# Patient Record
Sex: MALE | Race: WHITE | NOT HISPANIC OR LATINO | Employment: FULL TIME | URBAN - METROPOLITAN AREA
[De-identification: names, ages, dates, MRNs, and addresses within clinical notes are randomized per-mention and may not be internally consistent; named-entity substitution may affect disease eponyms.]

---

## 2018-04-09 ENCOUNTER — HOSPITAL ENCOUNTER (OUTPATIENT)
Facility: HOSPITAL | Age: 38
LOS: 1 days | Discharge: HOME | End: 2018-04-12
Attending: EMERGENCY MEDICINE | Admitting: INTERNAL MEDICINE

## 2018-04-09 DIAGNOSIS — Q85.00 NEUROFIBROMATOSIS: ICD-10-CM

## 2018-04-09 DIAGNOSIS — K92.2 GI BLEED DUE TO NSAIDS: ICD-10-CM

## 2018-04-09 DIAGNOSIS — T39.395A GI BLEED DUE TO NSAIDS: ICD-10-CM

## 2018-04-09 DIAGNOSIS — D50.0 ANEMIA DUE TO BLOOD LOSS: Primary | ICD-10-CM

## 2018-04-09 DIAGNOSIS — K92.0 HEMATEMESIS WITH NAUSEA: ICD-10-CM

## 2018-04-09 PROBLEM — R10.9 ABDOMINAL PAIN: Chronic | Status: ACTIVE | Noted: 2018-04-09

## 2018-04-09 PROBLEM — Z72.0 TOBACCO ABUSE: Chronic | Status: ACTIVE | Noted: 2018-04-09

## 2018-04-09 LAB
ABO + RH BLD: NORMAL
ALBUMIN SERPL BCP-MCNC: 3.7 G/DL
ALP SERPL-CCNC: 49 U/L
ALT SERPL W/O P-5'-P-CCNC: 38 U/L
ANION GAP SERPL CALC-SCNC: 7 MMOL/L
APTT BLDCRRT: 26.1 SEC
AST SERPL-CCNC: 26 U/L
BASOPHILS # BLD AUTO: 0.04 K/UL
BASOPHILS NFR BLD: 0.2 %
BILIRUB SERPL-MCNC: 0.3 MG/DL
BLD GP AB SCN CELLS X3 SERPL QL: NORMAL
BUN SERPL-MCNC: 52 MG/DL
CALCIUM SERPL-MCNC: 8.7 MG/DL
CHLORIDE SERPL-SCNC: 109 MMOL/L
CO2 SERPL-SCNC: 20 MMOL/L
CREAT SERPL-MCNC: 0.9 MG/DL
DIFFERENTIAL METHOD: ABNORMAL
EOSINOPHIL # BLD AUTO: 0 K/UL
EOSINOPHIL NFR BLD: 0 %
ERYTHROCYTE [DISTWIDTH] IN BLOOD BY AUTOMATED COUNT: 12.9 %
EST. GFR  (AFRICAN AMERICAN): >60 ML/MIN/1.73 M^2
EST. GFR  (NON AFRICAN AMERICAN): >60 ML/MIN/1.73 M^2
GLUCOSE SERPL-MCNC: 133 MG/DL
HCT VFR BLD AUTO: 28.1 %
HGB BLD-MCNC: 9.4 G/DL
IMM GRANULOCYTES # BLD AUTO: 0.34 K/UL
IMM GRANULOCYTES NFR BLD AUTO: 1.9 %
LYMPHOCYTES # BLD AUTO: 2.4 K/UL
LYMPHOCYTES NFR BLD: 13.7 %
MCH RBC QN AUTO: 32.3 PG
MCHC RBC AUTO-ENTMCNC: 33.5 G/DL
MCV RBC AUTO: 97 FL
MONOCYTES # BLD AUTO: 0.8 K/UL
MONOCYTES NFR BLD: 4.4 %
NEUTROPHILS # BLD AUTO: 13.9 K/UL
NEUTROPHILS NFR BLD: 79.8 %
NRBC BLD-RTO: 0 /100 WBC
PLATELET # BLD AUTO: 362 K/UL
PMV BLD AUTO: 11.3 FL
POTASSIUM SERPL-SCNC: 4.5 MMOL/L
PROT SERPL-MCNC: 6.7 G/DL
RBC # BLD AUTO: 2.91 M/UL
SODIUM SERPL-SCNC: 136 MMOL/L
WBC # BLD AUTO: 17.46 K/UL

## 2018-04-09 PROCEDURE — 85730 THROMBOPLASTIN TIME PARTIAL: CPT

## 2018-04-09 PROCEDURE — G0378 HOSPITAL OBSERVATION PER HR: HCPCS

## 2018-04-09 PROCEDURE — 96375 TX/PRO/DX INJ NEW DRUG ADDON: CPT

## 2018-04-09 PROCEDURE — 63600175 PHARM REV CODE 636 W HCPCS: Performed by: INTERNAL MEDICINE

## 2018-04-09 PROCEDURE — 80053 COMPREHEN METABOLIC PANEL: CPT

## 2018-04-09 PROCEDURE — 96374 THER/PROPH/DIAG INJ IV PUSH: CPT

## 2018-04-09 PROCEDURE — 96376 TX/PRO/DX INJ SAME DRUG ADON: CPT

## 2018-04-09 PROCEDURE — C9113 INJ PANTOPRAZOLE SODIUM, VIA: HCPCS | Performed by: INTERNAL MEDICINE

## 2018-04-09 PROCEDURE — 86920 COMPATIBILITY TEST SPIN: CPT

## 2018-04-09 PROCEDURE — 86850 RBC ANTIBODY SCREEN: CPT

## 2018-04-09 PROCEDURE — 25500020 PHARM REV CODE 255: Performed by: EMERGENCY MEDICINE

## 2018-04-09 PROCEDURE — 85025 COMPLETE CBC W/AUTO DIFF WBC: CPT

## 2018-04-09 PROCEDURE — 36415 COLL VENOUS BLD VENIPUNCTURE: CPT

## 2018-04-09 PROCEDURE — 25000003 PHARM REV CODE 250: Performed by: INTERNAL MEDICINE

## 2018-04-09 PROCEDURE — 99285 EMERGENCY DEPT VISIT HI MDM: CPT

## 2018-04-09 RX ORDER — PANTOPRAZOLE SODIUM 40 MG/10ML
40 INJECTION, POWDER, LYOPHILIZED, FOR SOLUTION INTRAVENOUS EVERY 12 HOURS
Status: DISPENSED | OUTPATIENT
Start: 2018-04-09 | End: 2018-04-11

## 2018-04-09 RX ORDER — ONDANSETRON 2 MG/ML
4 INJECTION INTRAMUSCULAR; INTRAVENOUS EVERY 8 HOURS PRN
Status: DISCONTINUED | OUTPATIENT
Start: 2018-04-09 | End: 2018-04-11

## 2018-04-09 RX ORDER — IBUPROFEN 200 MG
600 TABLET ORAL EVERY 8 HOURS PRN
Status: ON HOLD | COMMUNITY
End: 2018-04-12 | Stop reason: HOSPADM

## 2018-04-09 RX ORDER — IBUPROFEN 200 MG
1 TABLET ORAL DAILY PRN
Status: DISCONTINUED | OUTPATIENT
Start: 2018-04-09 | End: 2018-04-12 | Stop reason: HOSPADM

## 2018-04-09 RX ORDER — ACETAMINOPHEN 325 MG/1
650 TABLET ORAL EVERY 4 HOURS PRN
Status: DISCONTINUED | OUTPATIENT
Start: 2018-04-09 | End: 2018-04-12 | Stop reason: HOSPADM

## 2018-04-09 RX ORDER — MORPHINE SULFATE 4 MG/ML
2 INJECTION, SOLUTION INTRAMUSCULAR; INTRAVENOUS EVERY 4 HOURS PRN
Status: DISCONTINUED | OUTPATIENT
Start: 2018-04-09 | End: 2018-04-12 | Stop reason: HOSPADM

## 2018-04-09 RX ORDER — PROMETHAZINE HYDROCHLORIDE 25 MG/1
25 TABLET ORAL EVERY 6 HOURS PRN
Status: DISCONTINUED | OUTPATIENT
Start: 2018-04-09 | End: 2018-04-12 | Stop reason: HOSPADM

## 2018-04-09 RX ORDER — SODIUM CHLORIDE 0.9 % (FLUSH) 0.9 %
5 SYRINGE (ML) INJECTION
Status: DISCONTINUED | OUTPATIENT
Start: 2018-04-09 | End: 2018-04-12 | Stop reason: HOSPADM

## 2018-04-09 RX ORDER — SODIUM CHLORIDE 9 MG/ML
INJECTION, SOLUTION INTRAVENOUS CONTINUOUS
Status: DISCONTINUED | OUTPATIENT
Start: 2018-04-09 | End: 2018-04-10

## 2018-04-09 RX ADMIN — MORPHINE SULFATE 2 MG: 4 INJECTION INTRAVENOUS at 09:04

## 2018-04-09 RX ADMIN — MORPHINE SULFATE 2 MG: 4 INJECTION INTRAVENOUS at 05:04

## 2018-04-09 RX ADMIN — IOHEXOL 75 ML: 350 INJECTION, SOLUTION INTRAVENOUS at 02:04

## 2018-04-09 RX ADMIN — SODIUM CHLORIDE 1000 ML: 9 INJECTION, SOLUTION INTRAVENOUS at 05:04

## 2018-04-09 RX ADMIN — PANTOPRAZOLE SODIUM 40 MG: 40 INJECTION, POWDER, FOR SOLUTION INTRAVENOUS at 05:04

## 2018-04-09 NOTE — ED NOTES
Radiology Tech. Reports iv in RAC infiltrated when flushing with ns before ct, reports removing the iv and starting 20g iv in LAC

## 2018-04-09 NOTE — PLAN OF CARE
Problem: Fall Risk (Adult)  Goal: Identify Related Risk Factors and Signs and Symptoms  Related risk factors and signs and symptoms are identified upon initiation of Human Response Clinical Practice Guideline (CPG)    04/09/18 8079   Fall Risk   Related Risk Factors (Fall Risk) history of falls

## 2018-04-09 NOTE — ED PROVIDER NOTES
Encounter Date: 4/9/2018       History     Chief Complaint   Patient presents with    Shortness of Breath     last night    Abdominal Pain    Vomiting    Hematemesis     last night    Rectal Bleeding     last night    Dizziness     P is a 36 yo WM with c/o SOB and fatigue for the past few days. Pt also states he has been passing blood per rectum and emisis. Pt states he takes a lot of Ibuprophen every day.           Review of patient's allergies indicates:   Allergen Reactions    Toradol [ketorolac] Other (See Comments)     Chest pain      Past Medical History:   Diagnosis Date    Asthma     Kidney stones     Neurofibromatosis, type 1 (von Recklinghausen's disease)      Past Surgical History:   Procedure Laterality Date    LITHOTRIPSY Left      Family History   Problem Relation Age of Onset    Heart disease Mother      Social History   Substance Use Topics    Smoking status: Current Every Day Smoker     Packs/day: 2.00     Types: Cigarettes    Smokeless tobacco: Never Used    Alcohol use 1.2 oz/week     2 Standard drinks or equivalent per week     Review of Systems   Constitutional: Positive for activity change and fatigue.   Gastrointestinal: Positive for blood in stool.       Physical Exam     Initial Vitals [04/09/18 1247]   BP Pulse Resp Temp SpO2   124/77 110 20 97.6 °F (36.4 °C) 100 %      MAP       92.67         Physical Exam    Nursing note and vitals reviewed.  Constitutional: He appears well-developed and well-nourished. He appears lethargic. He is easily aroused. He has a sickly appearance. He appears ill.   HENT:   Head: Normocephalic.   Right Ear: External ear normal.   Left Ear: External ear normal.   Nose: Nose normal.   Mouth/Throat: Oropharynx is clear and moist.   Eyes: Conjunctivae and EOM are normal. Pupils are equal, round, and reactive to light.   Neck: Normal range of motion. Neck supple.   Cardiovascular: Normal rate, regular rhythm, normal heart sounds and intact distal  pulses.   No murmur heard.  Pulmonary/Chest: Breath sounds normal. He has no wheezes. He has no rhonchi. He has no rales.   Abdominal: Soft. Bowel sounds are normal. He exhibits no mass. There is no tenderness.   Genitourinary: Penis normal.   Musculoskeletal: Normal range of motion.   Neurological: He is oriented to person, place, and time and easily aroused. He has normal strength and normal reflexes. He appears lethargic. He displays normal reflexes. No cranial nerve deficit.   Skin: Skin is dry and intact. Capillary refill takes less than 2 seconds. There is pallor.   Psychiatric: He has a normal mood and affect. His behavior is normal. Judgment and thought content normal.         ED Course   Procedures  Labs Reviewed   APTT   CBC W/ AUTO DIFFERENTIAL   COMPREHENSIVE METABOLIC PANEL   TYPE & SCREEN                                  Clinical Impression:   The primary encounter diagnosis was Anemia due to blood loss. Diagnoses of GI bleed due to NSAIDs, Hematemesis with nausea, and Neurofibromatosis were also pertinent to this visit.                           Joel Espinoza MD  04/09/18 7702

## 2018-04-09 NOTE — SUBJECTIVE & OBJECTIVE
Past Medical History:   Diagnosis Date    Asthma     Kidney stones     Neurofibromatosis        Past Surgical History:   Procedure Laterality Date    LITHOTRIPSY Left        Review of patient's allergies indicates:   Allergen Reactions    Toradol [ketorolac] Other (See Comments)     Chest pain        No current facility-administered medications on file prior to encounter.      Current Outpatient Prescriptions on File Prior to Encounter   Medication Sig    [DISCONTINUED] alprazolam (XANAX) 2 MG tablet Take 2 mg by mouth nightly as needed.      [DISCONTINUED] carisoprodol (SOMA) 350 MG tablet Take 350 mg by mouth 4 (four) times daily as needed.      [DISCONTINUED] oxycodone (OXYCONTIN) 30 MG Tb12 Take 30 mg by mouth every 12 (twelve) hours.       Family History     Problem Relation (Age of Onset)    Heart disease Mother        Social History Main Topics    Smoking status: Current Every Day Smoker     Packs/day: 2.00     Types: Cigarettes    Smokeless tobacco: Never Used    Alcohol use 1.2 oz/week     2 Standard drinks or equivalent per week      Comment: 2-3 crown's per night    Drug use: No    Sexual activity: Yes     Partners: Female      Comment: fiance     Review of Systems 12 point review of systems reviewed with patient and otherwise negative unless listed in HPI  Objective:     Vital Signs (Most Recent):  Temp: 97.8 °F (36.6 °C) (04/09/18 1628)  Pulse: 102 (04/09/18 1628)  Resp: 18 (04/09/18 1628)  BP: 124/65 (04/09/18 1628)  SpO2: 98 % (04/09/18 1628) Vital Signs (24h Range):  Temp:  [97.6 °F (36.4 °C)-97.8 °F (36.6 °C)] 97.8 °F (36.6 °C)  Pulse:  [102-110] 102  Resp:  [10-20] 18  SpO2:  [96 %-100 %] 98 %  BP: (103-124)/(54-90) 124/65     Weight: 81.6 kg (180 lb)  Body mass index is 30.9 kg/m².    Physical Exam     AAOx4, looks ill, pallor  Pupils equal and reactive, OP mod dry, poor dentition  Neck supple  Tachy but regular 110's; Right ACW without lesions, TTP  Diffuse mild expiratory wheeze otw  clear  Soft, diffuse mild to mod tenderness without G/R, decreased BS  No edema, no rash  Moves all extremities equal and on command

## 2018-04-09 NOTE — ASSESSMENT & PLAN NOTE
Sx's concerning for GI blood loss/PUD.  Hgb 9.4 (14.7 4/2015), admit BUN >50 with normal Cr.  CT-A abdomen and pelvis normal.  High dose NSAID use recently with tobacco use and daily etoh intake.  Admit obs for PPI, EGD (I spoke with Dr Garcia), +/- colonoscopy, iron studies.  Type and screen and transfuse if further Hgb decrease after hydration (as currently symptomatic).

## 2018-04-09 NOTE — HPI
"36 yo male with several weeks intermittent N/V without food relations.  Occ the emesis is dark.  2 weeks ago "strained" right lateral chest and has been taking Ibuprofen 800mg 2-3 x's per day.  Has noticed dark stools for last few weeks (very vague) with BM's about q72 hours.  No known PUD.  No weight loss.  Last night, paulina noted him very pale then diaphoretic with near syncopal event in shower (no LOC but minor face trauma).  Because of ongoing pain, presented to ER today.  "

## 2018-04-09 NOTE — H&P
"[facility] Medicine  History & Physical    Patient Name: Sandeep Hansen Jr.  MRN: [id number]  Admission Date: 4/9/2018  Attending Physician: Maldonado Dueñas MD   Primary Care Provider: Primary Doctor No         Patient information was obtained from patient and ER records.     Subjective:     Principal Problem:Anemia due to blood loss    Chief Complaint:   Chief Complaint   Patient presents with    Shortness of Breath     last night    Abdominal Pain    Vomiting    Hematemesis     last night    Rectal Bleeding     last night    Dizziness        HPI: 38 yo male with several weeks intermittent N/V without food relations.  Occ the emesis is dark.  2 weeks ago "strained" right lateral chest and has been taking Ibuprofen 800mg 2-3 x's per day.  Has noticed dark stools for last few weeks (very vague) with BM's about q72 hours.  No known PUD.  No weight loss.  Last night, fiance noted him very pale then diaphoretic with near syncopal event in shower (no LOC but minor face trauma).  Because of ongoing pain, presented to ER today.    Past Medical History:   Diagnosis Date    Asthma     Kidney stones     Neurofibromatosis        Past Surgical History:   Procedure Laterality Date    LITHOTRIPSY Left        Review of patient's allergies indicates:   Allergen Reactions    Toradol [ketorolac] Other (See Comments)     Chest pain        No current facility-administered medications on file prior to encounter.      Current Outpatient Prescriptions on File Prior to Encounter   Medication Sig    [DISCONTINUED] alprazolam (XANAX) 2 MG tablet Take 2 mg by mouth nightly as needed.      [DISCONTINUED] carisoprodol (SOMA) 350 MG tablet Take 350 mg by mouth 4 (four) times daily as needed.      [DISCONTINUED] oxycodone (OXYCONTIN) 30 MG Tb12 Take 30 mg by mouth every 12 (twelve) hours.       Family History     Problem Relation (Age of Onset)    Heart disease Mother        Social History " Main Topics    Smoking status: Current Every Day Smoker     Packs/day: 2.00     Types: Cigarettes    Smokeless tobacco: Never Used    Alcohol use 1.2 oz/week     2 Standard drinks or equivalent per week      Comment: 2-3 crown's per night    Drug use: No    Sexual activity: Yes     Partners: Female      Comment: fiance     Review of Systems 12 point review of systems reviewed with patient and otherwise negative unless listed in HPI  Objective:     Vital Signs (Most Recent):  Temp: 97.8 °F (36.6 °C) (04/09/18 1628)  Pulse: 102 (04/09/18 1628)  Resp: 18 (04/09/18 1628)  BP: 124/65 (04/09/18 1628)  SpO2: 98 % (04/09/18 1628) Vital Signs (24h Range):  Temp:  [97.6 °F (36.4 °C)-97.8 °F (36.6 °C)] 97.8 °F (36.6 °C)  Pulse:  [102-110] 102  Resp:  [10-20] 18  SpO2:  [96 %-100 %] 98 %  BP: (103-124)/(54-90) 124/65     Weight: 81.6 kg (180 lb)  Body mass index is 30.9 kg/m².    Physical Exam     AAOx4, looks ill, pallor  Pupils equal and reactive, OP mod dry, poor dentition  Neck supple  Tachy but regular 110's; Right ACW without lesions, TTP  Diffuse mild expiratory wheeze otw clear  Soft, diffuse mild to mod tenderness without G/R, decreased BS  No edema, no rash  Moves all extremities equal and on command    Assessment/Plan:     * Anemia due to blood loss    Sx's concerning for GI blood loss/PUD.  Hgb 9.4 (14.7 4/2015), admit BUN >50 with normal Cr.  CT-A abdomen and pelvis normal.  High dose NSAID use recently with tobacco use and daily etoh intake.  Admit obs for PPI, EGD (I spoke with Dr Garcia), +/- colonoscopy, iron studies.  Type and screen and transfuse if further Hgb decrease after hydration (as currently symptomatic).          GI bleed due to NSAIDs    As above            Tobacco abuse    lectured          VTE Risk Mitigation         Ordered     IP VTE HIGH RISK PATIENT  Once      04/09/18 1631     Place sequential compression device  Until discontinued      04/09/18 1631             Maldonado Dueñas,  MD  Department of Hospital Medicine   [facility] - Med Surg

## 2018-04-10 ENCOUNTER — ANESTHESIA EVENT (OUTPATIENT)
Dept: SURGERY | Facility: HOSPITAL | Age: 38
End: 2018-04-10

## 2018-04-10 ENCOUNTER — ANESTHESIA (OUTPATIENT)
Dept: SURGERY | Facility: HOSPITAL | Age: 38
End: 2018-04-10

## 2018-04-10 LAB
ANION GAP SERPL CALC-SCNC: 5 MMOL/L
BLD PROD TYP BPU: NORMAL
BLD PROD TYP BPU: NORMAL
BLOOD UNIT EXPIRATION DATE: NORMAL
BLOOD UNIT EXPIRATION DATE: NORMAL
BLOOD UNIT TYPE CODE: 5100
BLOOD UNIT TYPE CODE: 5100
BLOOD UNIT TYPE: NORMAL
BLOOD UNIT TYPE: NORMAL
BUN SERPL-MCNC: 25 MG/DL
CALCIUM SERPL-MCNC: 8.4 MG/DL
CHLORIDE SERPL-SCNC: 110 MMOL/L
CO2 SERPL-SCNC: 21 MMOL/L
CODING SYSTEM: NORMAL
CODING SYSTEM: NORMAL
CREAT SERPL-MCNC: 0.9 MG/DL
DISPENSE STATUS: NORMAL
DISPENSE STATUS: NORMAL
ERYTHROCYTE [DISTWIDTH] IN BLOOD BY AUTOMATED COUNT: 13.2 %
EST. GFR  (AFRICAN AMERICAN): >60 ML/MIN/1.73 M^2
EST. GFR  (NON AFRICAN AMERICAN): >60 ML/MIN/1.73 M^2
GLUCOSE SERPL-MCNC: 101 MG/DL
HCT VFR BLD AUTO: 22 %
HGB BLD-MCNC: 7.5 G/DL
LIPASE SERPL-CCNC: 11 U/L
MCH RBC QN AUTO: 32.3 PG
MCHC RBC AUTO-ENTMCNC: 34.1 G/DL
MCV RBC AUTO: 95 FL
NUM UNITS TRANS PACKED RBC: NORMAL
NUM UNITS TRANS PACKED RBC: NORMAL
PLATELET # BLD AUTO: 268 K/UL
PMV BLD AUTO: 10.8 FL
POTASSIUM SERPL-SCNC: 4.1 MMOL/L
RBC # BLD AUTO: 2.32 M/UL
SODIUM SERPL-SCNC: 136 MMOL/L
WBC # BLD AUTO: 11.52 K/UL

## 2018-04-10 PROCEDURE — 96375 TX/PRO/DX INJ NEW DRUG ADDON: CPT

## 2018-04-10 PROCEDURE — 25000003 PHARM REV CODE 250: Performed by: INTERNAL MEDICINE

## 2018-04-10 PROCEDURE — 36430 TRANSFUSION BLD/BLD COMPNT: CPT

## 2018-04-10 PROCEDURE — 36415 COLL VENOUS BLD VENIPUNCTURE: CPT

## 2018-04-10 PROCEDURE — 43239 EGD BIOPSY SINGLE/MULTIPLE: CPT | Performed by: SURGERY

## 2018-04-10 PROCEDURE — 37000009 HC ANESTHESIA EA ADD 15 MINS: Performed by: SURGERY

## 2018-04-10 PROCEDURE — 27201012 HC FORCEPS, HOT/COLD, DISP: Performed by: SURGERY

## 2018-04-10 PROCEDURE — 63600175 PHARM REV CODE 636 W HCPCS: Performed by: NURSE ANESTHETIST, CERTIFIED REGISTERED

## 2018-04-10 PROCEDURE — 25000003 PHARM REV CODE 250: Performed by: ANESTHESIOLOGY

## 2018-04-10 PROCEDURE — G0378 HOSPITAL OBSERVATION PER HR: HCPCS

## 2018-04-10 PROCEDURE — P9016 RBC LEUKOCYTES REDUCED: HCPCS

## 2018-04-10 PROCEDURE — 37000008 HC ANESTHESIA 1ST 15 MINUTES: Performed by: SURGERY

## 2018-04-10 PROCEDURE — 88305 TISSUE EXAM BY PATHOLOGIST: CPT | Mod: 26,,, | Performed by: PATHOLOGY

## 2018-04-10 PROCEDURE — 88342 IMHCHEM/IMCYTCHM 1ST ANTB: CPT | Performed by: PATHOLOGY

## 2018-04-10 PROCEDURE — 88342 IMHCHEM/IMCYTCHM 1ST ANTB: CPT | Mod: 26,,, | Performed by: PATHOLOGY

## 2018-04-10 PROCEDURE — 96376 TX/PRO/DX INJ SAME DRUG ADON: CPT

## 2018-04-10 PROCEDURE — 80048 BASIC METABOLIC PNL TOTAL CA: CPT

## 2018-04-10 PROCEDURE — 85027 COMPLETE CBC AUTOMATED: CPT

## 2018-04-10 PROCEDURE — 63600175 PHARM REV CODE 636 W HCPCS: Performed by: INTERNAL MEDICINE

## 2018-04-10 PROCEDURE — 88305 TISSUE EXAM BY PATHOLOGIST: CPT | Performed by: PATHOLOGY

## 2018-04-10 PROCEDURE — 83690 ASSAY OF LIPASE: CPT

## 2018-04-10 PROCEDURE — D9220A PRA ANESTHESIA: Mod: ,,, | Performed by: ANESTHESIOLOGY

## 2018-04-10 PROCEDURE — 99244 OFF/OP CNSLTJ NEW/EST MOD 40: CPT | Mod: 57,,, | Performed by: SURGERY

## 2018-04-10 PROCEDURE — C9113 INJ PANTOPRAZOLE SODIUM, VIA: HCPCS | Performed by: INTERNAL MEDICINE

## 2018-04-10 PROCEDURE — 43239 EGD BIOPSY SINGLE/MULTIPLE: CPT | Mod: ,,, | Performed by: SURGERY

## 2018-04-10 RX ORDER — SODIUM CHLORIDE, SODIUM LACTATE, POTASSIUM CHLORIDE, CALCIUM CHLORIDE 600; 310; 30; 20 MG/100ML; MG/100ML; MG/100ML; MG/100ML
125 INJECTION, SOLUTION INTRAVENOUS CONTINUOUS
Status: DISCONTINUED | OUTPATIENT
Start: 2018-04-10 | End: 2018-04-12 | Stop reason: HOSPADM

## 2018-04-10 RX ORDER — SODIUM CHLORIDE, SODIUM LACTATE, POTASSIUM CHLORIDE, CALCIUM CHLORIDE 600; 310; 30; 20 MG/100ML; MG/100ML; MG/100ML; MG/100ML
INJECTION, SOLUTION INTRAVENOUS CONTINUOUS
Status: DISCONTINUED | OUTPATIENT
Start: 2018-04-10 | End: 2018-04-10

## 2018-04-10 RX ORDER — DIPHENHYDRAMINE HYDROCHLORIDE 50 MG/ML
12.5 INJECTION INTRAMUSCULAR; INTRAVENOUS
Status: DISCONTINUED | OUTPATIENT
Start: 2018-04-10 | End: 2018-04-12 | Stop reason: HOSPADM

## 2018-04-10 RX ORDER — SODIUM CHLORIDE, SODIUM LACTATE, POTASSIUM CHLORIDE, CALCIUM CHLORIDE 600; 310; 30; 20 MG/100ML; MG/100ML; MG/100ML; MG/100ML
125 INJECTION, SOLUTION INTRAVENOUS CONTINUOUS
Status: DISCONTINUED | OUTPATIENT
Start: 2018-04-10 | End: 2018-04-10

## 2018-04-10 RX ORDER — HYDROCODONE BITARTRATE AND ACETAMINOPHEN 500; 5 MG/1; MG/1
TABLET ORAL
Status: DISCONTINUED | OUTPATIENT
Start: 2018-04-10 | End: 2018-04-12 | Stop reason: HOSPADM

## 2018-04-10 RX ORDER — PANTOPRAZOLE SODIUM 40 MG/1
40 TABLET, DELAYED RELEASE ORAL DAILY
Status: DISCONTINUED | OUTPATIENT
Start: 2018-04-11 | End: 2018-04-12 | Stop reason: HOSPADM

## 2018-04-10 RX ORDER — MIDAZOLAM HYDROCHLORIDE 1 MG/ML
INJECTION, SOLUTION INTRAMUSCULAR; INTRAVENOUS
Status: DISCONTINUED | OUTPATIENT
Start: 2018-04-10 | End: 2018-04-10

## 2018-04-10 RX ORDER — PROPOFOL 10 MG/ML
VIAL (ML) INTRAVENOUS
Status: DISCONTINUED | OUTPATIENT
Start: 2018-04-10 | End: 2018-04-10

## 2018-04-10 RX ORDER — LIDOCAINE HYDROCHLORIDE 10 MG/ML
1 INJECTION, SOLUTION EPIDURAL; INFILTRATION; INTRACAUDAL; PERINEURAL ONCE
Status: DISCONTINUED | OUTPATIENT
Start: 2018-04-10 | End: 2018-04-12

## 2018-04-10 RX ADMIN — SODIUM CHLORIDE: 9 INJECTION, SOLUTION INTRAVENOUS at 02:04

## 2018-04-10 RX ADMIN — PROPOFOL 50 MG: 10 INJECTION, EMULSION INTRAVENOUS at 12:04

## 2018-04-10 RX ADMIN — PROPOFOL 20 MG: 10 INJECTION, EMULSION INTRAVENOUS at 12:04

## 2018-04-10 RX ADMIN — MIDAZOLAM HYDROCHLORIDE 2 MG: 1 INJECTION, SOLUTION INTRAMUSCULAR; INTRAVENOUS at 12:04

## 2018-04-10 RX ADMIN — PANTOPRAZOLE SODIUM 40 MG: 40 INJECTION, POWDER, FOR SOLUTION INTRAVENOUS at 09:04

## 2018-04-10 RX ADMIN — ONDANSETRON 4 MG: 2 INJECTION INTRAMUSCULAR; INTRAVENOUS at 05:04

## 2018-04-10 RX ADMIN — MORPHINE SULFATE 2 MG: 4 INJECTION INTRAVENOUS at 09:04

## 2018-04-10 RX ADMIN — SODIUM CHLORIDE: 9 INJECTION, SOLUTION INTRAVENOUS at 05:04

## 2018-04-10 RX ADMIN — MORPHINE SULFATE 2 MG: 4 INJECTION INTRAVENOUS at 05:04

## 2018-04-10 RX ADMIN — PROPOFOL 10 MG: 10 INJECTION, EMULSION INTRAVENOUS at 12:04

## 2018-04-10 RX ADMIN — MORPHINE SULFATE 2 MG: 4 INJECTION INTRAVENOUS at 11:04

## 2018-04-10 RX ADMIN — SODIUM CHLORIDE 1000 ML: 9 INJECTION, SOLUTION INTRAVENOUS at 07:04

## 2018-04-10 RX ADMIN — SODIUM CHLORIDE, POTASSIUM CHLORIDE, SODIUM LACTATE AND CALCIUM CHLORIDE: 600; 310; 30; 20 INJECTION, SOLUTION INTRAVENOUS at 12:04

## 2018-04-10 NOTE — CONSULTS
[facility] - Med Surg  General Surgery  Consult Note    Patient Name: Sandeep Hansen Jr.  MRN: [id number]  Admission Date: 4/9/2018  Attending Physician: Caitlin BENJAMIN  Consult Physician: Kam Garcia MD  Primary Care Provider: Primary Doctor No      Inpatient consult to General Surgery  Consult performed by: KAM GARCIA  Consult ordered by: LESLI MAHONEY  Reason for consult: anemia/coffee ground emesis  Assessment/Recommendations: EGD today      Patient information was obtained from patient and ER records.     Subjective:     Chief Complaint/Reason for Admission: Weakness/melana/coffee ground emesis    History of Present Illness:  Sandeep Hansen Jr. is a 37 y.o. male with a history of Neurofibromentosis presents with complaints of weakness, fatigue to the ED last night and was admitted to the [facility] for anemia.  The patient endorses melanotic stools for 2 days.  Increased NSAID use associated with right flank pain from a pulled muscle at work per the patient in the last week.  All current symptoms started 2 days ago.  No alleviating factors.  Aggrevating = NSAIDs.  Surgery called in consultation.       Review of patient's allergies indicates:   Allergen Reactions    Toradol [ketorolac] Other (See Comments)     Chest pain        Past Medical History:   Diagnosis Date    Asthma     Kidney stones     Neurofibromatosis      Past Surgical History:   Procedure Laterality Date    LITHOTRIPSY Left      Family History     Problem Relation (Age of Onset)    Heart disease Mother        Social History Main Topics    Smoking status: Current Every Day Smoker     Packs/day: 2.00     Types: Cigarettes    Smokeless tobacco: Never Used    Alcohol use 1.2 oz/week     2 Standard drinks or equivalent per week      Comment: 2-3 crown's per night    Drug use: No    Sexual activity: Yes     Partners: Female      Comment: paulina     Review of Systems   Constitutional: Negative  for appetite change, chills and fever.   HENT: Negative for congestion, dental problem and drooling.    Eyes: Negative for photophobia, discharge and itching.   Respiratory: Negative for apnea and chest tightness.    Cardiovascular: Negative for chest pain, palpitations and leg swelling.   Gastrointestinal: Positive for vomiting. Negative for abdominal distention and abdominal pain.   Endocrine: Negative for cold intolerance and heat intolerance.   Genitourinary: Negative for difficulty urinating and dysuria.   Musculoskeletal: Negative for arthralgias and back pain.   Skin: Negative for color change and pallor.   Neurological: Positive for dizziness and light-headedness. Negative for facial asymmetry and headaches.   Hematological: Negative for adenopathy. Does not bruise/bleed easily.   Psychiatric/Behavioral: Negative for agitation, behavioral problems and confusion.     Objective:     Vital Signs (Most Recent):  Temp: 98.1 °F (36.7 °C) (04/10/18 0704)  Pulse: 98 (04/10/18 0704)  Resp: 20 (04/10/18 0704)  BP: (!) 124/49 (04/10/18 0704)  SpO2: 99 % (04/10/18 0704) Vital Signs (24h Range):  Temp:  [97.4 °F (36.3 °C)-98.4 °F (36.9 °C)] 98.1 °F (36.7 °C)  Pulse:  [] 98  Resp:  [10-20] 20  SpO2:  [96 %-100 %] 99 %  BP: (103-126)/(49-90) 124/49     Weight: 81.6 kg (180 lb)  Body mass index is 30.88 kg/m².    Physical Exam   Constitutional: He is oriented to person, place, and time. He appears well-developed and well-nourished.   HENT:   Head: Normocephalic and atraumatic.   Eyes: EOM are normal. Pupils are equal, round, and reactive to light.   Neck: Normal range of motion. Neck supple. No thyromegaly present.   Cardiovascular: Normal rate and regular rhythm.    No murmur heard.  Pulmonary/Chest: Effort normal and breath sounds normal. No respiratory distress.   Abdominal: Soft. Bowel sounds are normal. He exhibits no distension. There is tenderness in the epigastric area.   Musculoskeletal: Normal range of  motion. He exhibits no edema.   Neurological: He is alert and oriented to person, place, and time. No cranial nerve deficit.   Skin: Skin is warm. Capillary refill takes less than 2 seconds. No rash noted. He is not diaphoretic. No erythema.   Psychiatric: He has a normal mood and affect.       Significant Labs:  CBC:   Recent Labs  Lab 04/10/18  0453   WBC 11.52   RBC 2.32*   HGB 7.5*   HCT 22.0*      MCV 95   MCH 32.3*   MCHC 34.1     BMP:   Recent Labs  Lab 04/10/18  0453         K 4.1      CO2 21*   BUN 25*   CREATININE 0.9   CALCIUM 8.4*     CMP:   Recent Labs  Lab 04/09/18  1313 04/10/18  0453   * 101   CALCIUM 8.7 8.4*   ALBUMIN 3.7  --    PROT 6.7  --     136   K 4.5 4.1   CO2 20* 21*    110   BUN 52* 25*   CREATININE 0.9 0.9   ALKPHOS 49*  --    ALT 38  --    AST 26  --    BILITOT 0.3  --      LFTs:   Recent Labs  Lab 04/09/18  1313   ALT 38   AST 26   ALKPHOS 49*   BILITOT 0.3   PROT 6.7   ALBUMIN 3.7     Coagulation:   Recent Labs  Lab 04/09/18  1313   APTT 26.1     Specimen (12h ago through future)    None        No results for input(s): COLORU, CLARITYU, SPECGRAV, PHUR, PROTEINUA, GLUCOSEU, BILIRUBINCON, BLOODU, WBCU, RBCU, BACTERIA, MUCUS, NITRITE, LEUKOCYTESUR, UROBILINOGEN, HYALINECASTS in the last 168 hours.    Significant DiagnosticI have reviewed the radiology report on CT A/P.  Unable to view given our current system of chart doesnt allow it.  No acute findings besides nephrolithisis right non-obstructed    Assessment:   Sandeep Hansen Jr. is a 37 y.o. male who presents with Anemia due to blood loss.    Active Diagnoses:    Diagnosis Date Noted POA    PRINCIPAL PROBLEM:  Anemia due to blood loss [D50.0] 04/09/2018 Yes    Abdominal pain [R10.9] 04/09/2018 Yes     Chronic    Tobacco abuse [Z72.0] 04/09/2018 Yes     Chronic    GI bleed due to NSAIDs [K92.2, T39.395A] 04/09/2018 Yes      Problems Resolved During this Admission:    Diagnosis Date  Noted Date Resolved POA     VTE Risk Mitigation         Ordered     IP VTE HIGH RISK PATIENT  Once      04/09/18 1631     Place sequential compression device  Until discontinued      04/09/18 1631          Medical Decision Making/Plan:  Since admission the patient has been volume resuscitated.  H/H declined related the the above.  Does not appear to be continuing to actively bleed.  Agree with blood administration. Will plan for EGD today.  Possible C scope if EGD is inconclusive later in the hospital stay.  Differential includes peptic ulcer disease, gastritis, esophagitis, neoplasm, etc.    Risk and benefits of EGD were discussed in depth.  Risk of EGD were discussed to include bleeding as well as perforation.  Patient understands that if the above procedural risks were to occur, he could need further intervention to include but not exclude a blood transfusion, repeat procedure, admission to the hospital, or even surgery which would likely require transfer to a higher level of care.  Counseling time conducted 25-30 minutes.    Christophe Garcia MD  General Surgery  [facility] - Med Surg

## 2018-04-10 NOTE — ANESTHESIA PREPROCEDURE EVALUATION
04/10/2018  Sandeep Hansen Jr. is a 37 y.o., male.    Anesthesia Evaluation    I have reviewed the Patient Summary Reports.    I have reviewed the Nursing Notes.   I have reviewed the Medications.     Review of Systems  Social:  Smoker, Alcohol Use    Hematology/Oncology:         -- Anemia:   Pulmonary:  Asthma:  Chronic Obstructive Pulmonary Disease (COPD):    Renal/:  Kidney Function/Disease    Hepatic/GI:  Gi / Hepatic Symptoms: Melena.        Physical Exam  General:  Well nourished    Airway/Jaw/Neck:  Airway Findings: Mouth Opening: Normal General Airway Assessment: Adult  Mallampati: II  Improves to II with phonation.  TM Distance: Normal, at least 6 cm  Jaw/Neck Findings:  Neck ROM: Normal ROM      Dental:  Dental Findings: (Very poor repair, Carious to the gingival line)   Chest/Lungs:  Chest/Lungs Findings: Expiratory Wheezes, Mod.     Heart/Vascular:  Heart Findings: Rate: Normal  Rhythm: Regular Rhythm        Mental Status:  Mental Status Findings:  Cooperative, Alert and Oriented         Anesthesia Plan  Type of Anesthesia, risks & benefits discussed:  Anesthesia Type:  MAC  Patient's Preference:   Intra-op Monitoring Plan: standard ASA monitors  Intra-op Monitoring Plan Comments:   Post Op Pain Control Plan: IV/PO Opioids PRN  Post Op Pain Control Plan Comments:   Induction:    Beta Blocker:  Patient is not currently on a Beta-Blocker (No further documentation required).       Informed Consent: Patient understands risks and agrees with Anesthesia plan.  Questions answered. Anesthesia consent signed with patient.  ASA Score: 3     Day of Surgery Review of History & Physical: I have interviewed and examined the patient. I have reviewed the patient's H&P dated:            Ready For Surgery From Anesthesia Perspective.

## 2018-04-10 NOTE — PROVATION PATIENT INSTRUCTIONS
Discharge Summary/Instructions after an Endoscopic Procedure  Patient Name: Sandeep Hansen  Patient MRN: [id number]  Patient YOB: 1980  Tuesday, April 10, 2018  Christophe Garcia MD  RESTRICTIONS:  During your procedure today, you received medications for sedation.  These   medications may affect your judgment, balance and coordination.  Therefore,   for 24 hours, you have the following restrictions:   - DO NOT drive a car, operate machinery, make legal/financial decisions,   sign important papers or drink alcohol.    ACTIVITY:  The following day: return to full activity including work, except no heavy   lifting, straining or running for 3 days if polyps were removed.  DIET:  Eat and drink normally unless instructed otherwise.     TREATMENT FOR COMMON SIDE EFFECTS:  - Mild abdominal pain, nausea, belching, bloating or excessive gas:  rest,   eat lightly and use a heating pad.  - Sore Throat: treat with throat lozenges and/or gargle with warm salt   water.  - Because air was used during the procedure, expelling large amounts of air   from your rectum or belching is normal.  - If a bowel prep was taken, you may not have a bowel movement for 1-3 days.    This is normal.  SYMPTOMS TO WATCH FOR AND REPORT TO YOUR PHYSICIAN:  1. Abdominal pain or bloating, other than gas cramps.  2. Chest pain.  3. Back pain.  4. Signs of infection such as: chills or fever occurring within 24 hours   after the procedure.  5. Rectal bleeding, which would show as bright red, maroon, or black stools.   (A tablespoon of blood from the rectum is not serious, especially if   hemorrhoids are present.)  6. Vomiting.  7. Weakness or dizziness.  GO DIRECTLY TO THE NEAREST EMERGENCY ROOM IF YOU HAVE ANY OF THE FOLLOWING:      Difficulty breathing              Chills and/or fever over 101 F   Persistent vomiting and/or vomiting blood   Severe abdominal pain   Severe chest pain   Black, tarry stools   Bleeding- more than one  tablespoon   Any other symptom or condition that you feel may need urgent attention  Your doctor recommends these additional instructions:  If any biopsies were taken, your doctors clinic will contact you in 1 to 2   weeks with any results.  - Transfer patient to floor.   - Use Protonix (pantoprazole) 40 mg PO daily daily.   - Use sucralfate tablets 1 gram PO QID daily.   - Await pathology results.   - Return to my office in 2 weeks.   - Repeat upper endoscopy in 6 weeks for surveillance for ensure ulceration   healing.  Patient will also need Colonoscopy at that time.  For questions, problems or results please call your physician - Christophe Garcia MD at Work:  [phone].  [facility] EMERGENCY ROOM PHONE NUMBER: [phone]  IF A COMPLICATION OR EMERGENCY SITUATION ARISES AND YOU ARE UNABLE TO REACH   YOUR PHYSICIAN - GO DIRECTLY TO THE EMERGENCY ROOM.  MD Christophe Yoo MD  4/10/2018 2:39:28 PM  This report has been verified and signed electronically.

## 2018-04-10 NOTE — TRANSFER OF CARE
"Anesthesia Transfer of Care Note    Patient: Sandeep Hansen Jr.    Procedure(s) Performed: Procedure(s) (LRB):  ESOPHAGOGASTRODUODENOSCOPY (EGD) (N/A)    Patient location: PACU    Anesthesia Type: MAC    Transport from OR: Transported from OR on room air with adequate spontaneous ventilation    Post pain: adequate analgesia    Post assessment: no apparent anesthetic complications and tolerated procedure well    Post vital signs: stable    Level of consciousness: awake, alert and oriented    Nausea/Vomiting: no nausea/vomiting    Complications: none    Transfer of care protocol was followed      Last vitals:   Visit Vitals  /62 (BP Location: Right arm, Patient Position: Lying)   Pulse 92   Temp 36.8 °C (98.2 °F) (Oral)   Resp 20   Ht 5' 4.02" (1.626 m)   Wt 81.6 kg (180 lb)   SpO2 (!) 94%   BMI 30.88 kg/m²     "

## 2018-04-10 NOTE — PROGRESS NOTES
Continues with epigastric and right lateral chest discomfort.  No emesis overnight.  Overall feels better.    Vitals:    04/10/18 0704   BP: (!) 124/49   Pulse: 98   Resp: 20   Temp: 98.1 °F (36.7 °C)     AAOx4, talking easily, pallor  Pupils equal and reactive, OP mod dry, poor dentition  Neck supple  Tachy but regular 100's; Right ACW without lesions, TTP - improved  Diffuse mild expiratory wheeze otw clear  Soft, diffuse mild to mod tenderness epigastrum without G/R, decreased BS - unchanged  No edema, no rash  Moves all extremities equal and on command    A/P  * Anemia due to blood loss     Sx's concerning for GI blood loss/PUD. Admit Hgb 9.4 (14.7 4/2015), admit BUN >50 with normal Cr.  CT-A abdomen and pelvis normal.  High dose NSAID use recently with tobacco use and daily etoh intake. This AM Hgb decreased 9.4 to 7.5 after hydration.  Iron studies pending.  Transfuse 2 unit PRBC this Am as continues symptomatic (risks/benefits explained to pt and fiance and he agrees).  EGD pending.          GI bleed due to NSAIDs     As above                Tobacco abuse     lectured       **ADDENDUM 1400.  Spoke with Dr Garcia - EGD revealed DU without active bleeding (bx taken).  Will need repeat EGD (with colonoscopy) in 4-6 weeks and discharged on PPI (self pay).  Info on GoodRx given to patient.

## 2018-04-10 NOTE — ANESTHESIA POSTPROCEDURE EVALUATION
"Anesthesia Post Evaluation    Patient: Sandeep Hansen Jr.    Procedure(s) Performed: Procedure(s) (LRB):  ESOPHAGOGASTRODUODENOSCOPY (EGD) (N/A)    Final Anesthesia Type: MAC  Patient location during evaluation: PACU  Patient participation: Yes- Able to Participate  Level of consciousness: awake and alert  Pain management: adequate  Airway patency: patent  PONV status at discharge: No PONV  Anesthetic complications: no      Cardiovascular status: blood pressure returned to baseline  Respiratory status: unassisted  Hydration status: euvolemic  Follow-up not needed.        Visit Vitals  BP (!) 106/54 (BP Location: Right arm, Patient Position: Lying)   Pulse 87   Temp 36.2 °C (97.1 °F) (Oral)   Resp 18   Ht 5' 4.02" (1.626 m)   Wt 81.6 kg (180 lb)   SpO2 97%   BMI 30.88 kg/m²       Pain/Jenna Score: Pain Assessment Performed: Yes (4/10/2018  1:18 PM)  Presence of Pain: denies (4/10/2018  1:18 PM)  Pain Rating Prior to Med Admin: 8 (4/10/2018  9:16 AM)  Pain Rating Post Med Admin: 0 (4/10/2018  9:56 AM)  Jenna Score: 9 (4/10/2018  1:04 PM)      "

## 2018-04-10 NOTE — NURSING
1ST UNIT PRBC STARTED AND INFUSING WITHOUT PROBLEMS NOTED OR VERBALIZED. INSTRUCTED TO CALL FOR ANY PROBLEMS. VERBALIZED UNDERSTANDING.

## 2018-04-11 LAB
ERYTHROCYTE [DISTWIDTH] IN BLOOD BY AUTOMATED COUNT: 14 %
HCT VFR BLD AUTO: 25.1 %
HGB BLD-MCNC: 8.4 G/DL
MCH RBC QN AUTO: 31.5 PG
MCHC RBC AUTO-ENTMCNC: 33.5 G/DL
MCV RBC AUTO: 94 FL
PLATELET # BLD AUTO: 224 K/UL
PMV BLD AUTO: 11.1 FL
RBC # BLD AUTO: 2.67 M/UL
WBC # BLD AUTO: 7.27 K/UL

## 2018-04-11 PROCEDURE — 63600175 PHARM REV CODE 636 W HCPCS: Performed by: INTERNAL MEDICINE

## 2018-04-11 PROCEDURE — 99213 OFFICE O/P EST LOW 20 MIN: CPT | Mod: ,,, | Performed by: SURGERY

## 2018-04-11 PROCEDURE — 36415 COLL VENOUS BLD VENIPUNCTURE: CPT

## 2018-04-11 PROCEDURE — 85027 COMPLETE CBC AUTOMATED: CPT

## 2018-04-11 PROCEDURE — G0378 HOSPITAL OBSERVATION PER HR: HCPCS

## 2018-04-11 PROCEDURE — 96376 TX/PRO/DX INJ SAME DRUG ADON: CPT

## 2018-04-11 PROCEDURE — 25000003 PHARM REV CODE 250: Performed by: INTERNAL MEDICINE

## 2018-04-11 RX ORDER — ONDANSETRON 2 MG/ML
4 INJECTION INTRAMUSCULAR; INTRAVENOUS EVERY 4 HOURS PRN
Status: DISCONTINUED | OUTPATIENT
Start: 2018-04-11 | End: 2018-04-12 | Stop reason: HOSPADM

## 2018-04-11 RX ORDER — HYDROCODONE BITARTRATE AND ACETAMINOPHEN 7.5; 325 MG/1; MG/1
1 TABLET ORAL EVERY 6 HOURS PRN
Status: DISCONTINUED | OUTPATIENT
Start: 2018-04-11 | End: 2018-04-12 | Stop reason: HOSPADM

## 2018-04-11 RX ADMIN — MORPHINE SULFATE 2 MG: 4 INJECTION INTRAVENOUS at 07:04

## 2018-04-11 RX ADMIN — MORPHINE SULFATE 4 MG: 4 INJECTION INTRAVENOUS at 12:04

## 2018-04-11 RX ADMIN — PANTOPRAZOLE SODIUM 40 MG: 40 TABLET, DELAYED RELEASE ORAL at 10:04

## 2018-04-11 RX ADMIN — ONDANSETRON 4 MG: 2 INJECTION INTRAMUSCULAR; INTRAVENOUS at 06:04

## 2018-04-11 RX ADMIN — ONDANSETRON 4 MG: 2 INJECTION INTRAMUSCULAR; INTRAVENOUS at 12:04

## 2018-04-11 RX ADMIN — MORPHINE SULFATE 2 MG: 4 INJECTION INTRAVENOUS at 06:04

## 2018-04-11 NOTE — SUBJECTIVE & OBJECTIVE
Interval History:    Review of Systems   Constitutional: Positive for fatigue.   HENT: Negative.    Eyes: Negative.    Respiratory: Negative.    Cardiovascular: Negative.    Gastrointestinal: Positive for abdominal pain.   Endocrine: Negative.    Genitourinary: Negative.    Musculoskeletal: Negative.    Skin: Negative.    Allergic/Immunologic: Negative.    Neurological: Negative.    Hematological: Negative.    Psychiatric/Behavioral: Negative.      Objective:     Vital Signs (Most Recent):  Temp: 96.7 °F (35.9 °C) (04/11/18 0730)  Pulse: 74 (04/11/18 0730)  Resp: 18 (04/11/18 0730)  BP: (!) 111/59 (04/11/18 0730)  SpO2: 99 % (04/11/18 0730) Vital Signs (24h Range):  Temp:  [96.4 °F (35.8 °C)-98.5 °F (36.9 °C)] 96.7 °F (35.9 °C)  Pulse:  [74-92] 74  Resp:  [16-18] 18  SpO2:  [97 %-100 %] 99 %  BP: ()/(49-59) 111/59     Weight: 81.6 kg (180 lb)  Body mass index is 30.88 kg/m².    Intake/Output Summary (Last 24 hours) at 04/11/18 1413  Last data filed at 04/10/18 1652   Gross per 24 hour   Intake              600 ml   Output                0 ml   Net              600 ml      Physical Exam   Constitutional: He is oriented to person, place, and time. He appears well-developed and well-nourished.   HENT:   Head: Normocephalic and atraumatic.   Right Ear: External ear normal.   Left Ear: External ear normal.   Nose: Nose normal.   Mouth/Throat: Oropharynx is clear and moist.   Eyes: Conjunctivae and EOM are normal. Pupils are equal, round, and reactive to light.   Neck: Normal range of motion. Neck supple.   Cardiovascular: Normal rate, regular rhythm, normal heart sounds and intact distal pulses.    Pulmonary/Chest: Effort normal and breath sounds normal.   Abdominal: Soft. Bowel sounds are normal. There is tenderness.   Genitourinary: Rectum normal, prostate normal and penis normal.   Musculoskeletal: Normal range of motion.   Neurological: He is alert and oriented to person, place, and time.   Skin: Skin is warm  and dry. Capillary refill takes less than 2 seconds.   Psychiatric: He has a normal mood and affect. His behavior is normal. Judgment and thought content normal.       Significant Labs:   BMP:   Recent Labs  Lab 04/10/18  0453         K 4.1      CO2 21*   BUN 25*   CREATININE 0.9   CALCIUM 8.4*     CBC:   Recent Labs  Lab 04/10/18  0453 04/11/18  0453   WBC 11.52 7.27   HGB 7.5* 8.4*   HCT 22.0* 25.1*    224       Significant Imaging: I have reviewed all pertinent imaging results/findings within the past 24 hours.

## 2018-04-11 NOTE — PROGRESS NOTES
"[facility] Medicine  Progress Note    Patient Name: Sandeep Hansen Jr.  MRN: [id number]  Patient Class: OP- Observation   Admission Date: 4/9/2018  Length of Stay: 1 days  Attending Physician: Maldonado Dueñas MD  Primary Care Provider: Primary Doctor No        Subjective:     Principal Problem:Anemia due to blood loss    HPI:  38 yo male with several weeks intermittent N/V without food relations.  Occ the emesis is dark.  2 weeks ago "strained" right lateral chest and has been taking Ibuprofen 800mg 2-3 x's per day.  Has noticed dark stools for last few weeks (very vague) with BM's about q72 hours.  No known PUD.  No weight loss.  Last night, fiance noted him very pale then diaphoretic with near syncopal event in shower (no LOC but minor face trauma).  Because of ongoing pain, presented to ER today.    Hospital Course:  38yo male admitted for abdominal pain due to duodenal ulcer.  He has been using ibuprofen, tobacco and etoh.  He did receive 2 units of PRBCs yesterday but only went up by one point.  He has been cleared by surgery but I would like to reassess H&H in am for stablity.    Interval History:    Review of Systems   Constitutional: Positive for fatigue.   HENT: Negative.    Eyes: Negative.    Respiratory: Negative.    Cardiovascular: Negative.    Gastrointestinal: Positive for abdominal pain.   Endocrine: Negative.    Genitourinary: Negative.    Musculoskeletal: Negative.    Skin: Negative.    Allergic/Immunologic: Negative.    Neurological: Negative.    Hematological: Negative.    Psychiatric/Behavioral: Negative.      Objective:     Vital Signs (Most Recent):  Temp: 96.7 °F (35.9 °C) (04/11/18 0730)  Pulse: 74 (04/11/18 0730)  Resp: 18 (04/11/18 0730)  BP: (!) 111/59 (04/11/18 0730)  SpO2: 99 % (04/11/18 0730) Vital Signs (24h Range):  Temp:  [96.4 °F (35.8 °C)-98.5 °F (36.9 °C)] 96.7 °F (35.9 °C)  Pulse:  [74-92] 74  Resp:  [16-18] 18  SpO2:  [97 %-100 %] 99 " %  BP: ()/(49-59) 111/59     Weight: 81.6 kg (180 lb)  Body mass index is 30.88 kg/m².    Intake/Output Summary (Last 24 hours) at 04/11/18 1413  Last data filed at 04/10/18 1652   Gross per 24 hour   Intake              600 ml   Output                0 ml   Net              600 ml      Physical Exam   Constitutional: He is oriented to person, place, and time. He appears well-developed and well-nourished.   HENT:   Head: Normocephalic and atraumatic.   Right Ear: External ear normal.   Left Ear: External ear normal.   Nose: Nose normal.   Mouth/Throat: Oropharynx is clear and moist.   Eyes: Conjunctivae and EOM are normal. Pupils are equal, round, and reactive to light.   Neck: Normal range of motion. Neck supple.   Cardiovascular: Normal rate, regular rhythm, normal heart sounds and intact distal pulses.    Pulmonary/Chest: Effort normal and breath sounds normal.   Abdominal: Soft. Bowel sounds are normal. There is tenderness.   Genitourinary: Rectum normal, prostate normal and penis normal.   Musculoskeletal: Normal range of motion.   Neurological: He is alert and oriented to person, place, and time.   Skin: Skin is warm and dry. Capillary refill takes less than 2 seconds.   Psychiatric: He has a normal mood and affect. His behavior is normal. Judgment and thought content normal.       Significant Labs:   BMP:   Recent Labs  Lab 04/10/18  0453         K 4.1      CO2 21*   BUN 25*   CREATININE 0.9   CALCIUM 8.4*     CBC:   Recent Labs  Lab 04/10/18  0453 04/11/18  0453   WBC 11.52 7.27   HGB 7.5* 8.4*   HCT 22.0* 25.1*    224       Significant Imaging: I have reviewed all pertinent imaging results/findings within the past 24 hours.    Assessment/Plan:      * Anemia due to blood loss    Sx's concerning for GI blood loss/PUD.  Hgb 9.4 (14.7 4/2015), admit BUN >50 with normal Cr.  CT-A abdomen and pelvis normal.  High dose NSAID use recently with tobacco use and daily etoh intake.   Admit obs for PPI, EGD (I spoke with Dr Garcia), +/- colonoscopy, iron studies.  Type and screen and transfuse if further Hgb decrease after hydration (as currently symptomatic).          GI bleed due to NSAIDs    As above            Tobacco abuse    Lectured  Advised to stop smoking(6mins)        Abdominal pain    Follow labs  DVT prophylaxis  If H&Hstable tomorrow then will pursue discharge            VTE Risk Mitigation         Ordered     IP VTE HIGH RISK PATIENT  Once      04/09/18 1631     Place sequential compression device  Until discontinued      04/09/18 1631              Kasi Begum MD  Department of Hospital Medicine   [facility] - Med Surg

## 2018-04-11 NOTE — PROGRESS NOTES
[facility] - Med Surg  General Surgery  Daily Note    Patient Name: Sandeep Hansen Jr.  MRN: [id number]  Admission Date: 4/9/2018  Attending Physician: Kel BENJAMIN  Consult Physician: Christophe Garcia MD  Primary Care Provider: Primary Doctor No    Subjective:     Principle Problem: Anemia due to blood loss    Last 24 hour history:  No acute events since EGD yesterday.  Patient with continued right upper quadrant and right chest type pain.  Patient suggesting the pain is improving.  No episodes of continued nausea or vomiting.  Weakness improved after blood transfusion yesterday.  No new issues today    Objective:     Vital Signs (Most Recent):  Temp: 96.6 °F (35.9 °C) (04/11/18 1607)  Pulse: 83 (04/11/18 1607)  Resp: 19 (04/11/18 1607)  BP: 125/66 (04/11/18 1607)  SpO2: 98 % (04/11/18 1607) Vital Signs (24h Range):  Temp:  [96.4 °F (35.8 °C)-98.5 °F (36.9 °C)] 96.6 °F (35.9 °C)  Pulse:  [74-86] 83  Resp:  [16-19] 19  SpO2:  [97 %-100 %] 98 %  BP: (106-125)/(49-66) 125/66     Intake/Output last 24 hours:  No intake or output data in the 24 hours ending 04/11/18 1719    I/O last 3 completed shifts:  In: 1340 [I.V.:1040; Blood:300]  Out: -   No intake/output data recorded.    Weight: 81.6 kg (180 lb)  Body mass index is 30.88 kg/m².    Gen: Wd Wn male currently in NAD  Heent: Nc/At, MMM  Eyes: Perrl, Eomi  Cv: RRR, no  M/g/r  Lung: Non-labored breathing, clear bilaterally  Abd: Soft, mild tenderness right upper quadrant, non-distended    Significant Labs:  CBC:   Recent Labs  Lab 04/11/18  0453   WBC 7.27   RBC 2.67*   HGB 8.4*   HCT 25.1*      MCV 94   MCH 31.5*   MCHC 33.5     BMP:   Recent Labs  Lab 04/10/18  0453         K 4.1      CO2 21*   BUN 25*   CREATININE 0.9   CALCIUM 8.4*     CMP:   Recent Labs  Lab 04/09/18  1313 04/10/18  0453   * 101   CALCIUM 8.7 8.4*   ALBUMIN 3.7  --    PROT 6.7  --     136   K 4.5 4.1   CO2 20* 21*    110   BUN 52*  25*   CREATININE 0.9 0.9   ALKPHOS 49*  --    ALT 38  --    AST 26  --    BILITOT 0.3  --      LFTs:   Recent Labs  Lab 04/09/18  1313   ALT 38   AST 26   ALKPHOS 49*   BILITOT 0.3   PROT 6.7   ALBUMIN 3.7     Coagulation:   Recent Labs  Lab 04/09/18  1313   APTT 26.1     Specimen (12h ago through future)    None        No results for input(s): COLORU, CLARITYU, SPECGRAV, PHUR, PROTEINUA, GLUCOSEU, BILIRUBINCON, BLOODU, WBCU, RBCU, BACTERIA, MUCUS, NITRITE, LEUKOCYTESUR, UROBILINOGEN, HYALINECASTS in the last 168 hours.    Cultures:    Microbiology Results (last 7 days)     ** No results found for the last 168 hours. **          Significant Diagnostics:  No new    Assessment:   Sandeep Hansen Jr. is a 37 y.o. male who presents with Anemia due to blood loss.    Active Diagnoses:    Diagnosis Date Noted POA    PRINCIPAL PROBLEM:  Anemia due to blood loss [D50.0] 04/09/2018 Yes    Abdominal pain [R10.9] 04/09/2018 Yes     Chronic    Tobacco abuse [Z72.0] 04/09/2018 Yes     Chronic    GI bleed due to NSAIDs [K92.2, T39.395A] 04/09/2018 Yes      Problems Resolved During this Admission:    Diagnosis Date Noted Date Resolved POA     VTE Risk Mitigation         Ordered     IP VTE HIGH RISK PATIENT  Once      04/09/18 1631     Place sequential compression device  Until discontinued      04/09/18 1631          Medical Decision Making/Plan:  On EGD patient had significant duodenitis with duodenal ulcers which are in various stages of healing.  Patient received 2 units of blood yesterday without a truly appropriate response.  No new nausea or vomiting.  Pain no worse than previous, actually improving to some degree per the patient.  No continued melanotic stools.  Will continue to follow while in-house, however the patient will need repeat EGD and additionally a colonoscopy within the next 4-6 weeks as an outpatient.  We'll have the patient follow-up with me in 2 weeks post endoscopy and post discharge for pathology  review and scheduling of dual endoscopy.    Christophe Garcia MD  General Surgery  [facility] Surg

## 2018-04-11 NOTE — HOSPITAL COURSE
Janak Greco   Home Medication Instructions MAHENDRA:[id number]    38yo male admitted for abdominal pain due to duodenal ulcer.  He has been using ibuprofen, tobacco and etoh.  He did receive 2 units of PRBCs yesterday but only went up by one point.  He has been cleared by surgery but I would like to reassess H&H in am for stablity. His H&H did drop a little more over night.  He received 1 additional unit of PRBC.  This am his repeat H&H showed an appropriate response to the blood and he is stable.  Surgery has cleared him for discharge.   Status    Tissue Pathology Exam Collected (03/20/18 8720)    Basic Metabolic Panel In process    CBC (No Diff) In process           Fermin Velázquez MD  03/21/18  7:23 AM

## 2018-04-12 PROBLEM — K92.2 GI BLEED DUE TO NSAIDS: Status: RESOLVED | Noted: 2018-04-09 | Resolved: 2018-04-12

## 2018-04-12 PROBLEM — T39.395A GI BLEED DUE TO NSAIDS: Status: RESOLVED | Noted: 2018-04-09 | Resolved: 2018-04-12

## 2018-04-12 PROBLEM — D50.0 ANEMIA DUE TO BLOOD LOSS: Status: RESOLVED | Noted: 2018-04-09 | Resolved: 2018-04-12

## 2018-04-12 LAB
ALBUMIN SERPL BCP-MCNC: 3.1 G/DL
ALP SERPL-CCNC: 41 U/L
ALT SERPL W/O P-5'-P-CCNC: 41 U/L
ANION GAP SERPL CALC-SCNC: 3 MMOL/L
AST SERPL-CCNC: 32 U/L
BASOPHILS # BLD AUTO: 0.02 K/UL
BASOPHILS # BLD AUTO: 0.04 K/UL
BASOPHILS NFR BLD: 0.3 %
BASOPHILS NFR BLD: 0.6 %
BILIRUB SERPL-MCNC: 0.1 MG/DL
BLD PROD TYP BPU: NORMAL
BLOOD UNIT EXPIRATION DATE: NORMAL
BLOOD UNIT TYPE CODE: 5100
BLOOD UNIT TYPE: NORMAL
BUN SERPL-MCNC: 12 MG/DL
CALCIUM SERPL-MCNC: 8.6 MG/DL
CHLORIDE SERPL-SCNC: 109 MMOL/L
CO2 SERPL-SCNC: 27 MMOL/L
CODING SYSTEM: NORMAL
CREAT SERPL-MCNC: 0.9 MG/DL
DIFFERENTIAL METHOD: ABNORMAL
DIFFERENTIAL METHOD: ABNORMAL
DISPENSE STATUS: NORMAL
EOSINOPHIL # BLD AUTO: 0.1 K/UL
EOSINOPHIL # BLD AUTO: 0.1 K/UL
EOSINOPHIL NFR BLD: 1.1 %
EOSINOPHIL NFR BLD: 1.8 %
ERYTHROCYTE [DISTWIDTH] IN BLOOD BY AUTOMATED COUNT: 13.6 %
ERYTHROCYTE [DISTWIDTH] IN BLOOD BY AUTOMATED COUNT: 13.8 %
EST. GFR  (AFRICAN AMERICAN): >60 ML/MIN/1.73 M^2
EST. GFR  (NON AFRICAN AMERICAN): >60 ML/MIN/1.73 M^2
GLUCOSE SERPL-MCNC: 99 MG/DL
HCT VFR BLD AUTO: 23.7 %
HCT VFR BLD AUTO: 28.2 %
HGB BLD-MCNC: 8.1 G/DL
HGB BLD-MCNC: 9.7 G/DL
IMM GRANULOCYTES # BLD AUTO: 0.05 K/UL
IMM GRANULOCYTES # BLD AUTO: 0.06 K/UL
IMM GRANULOCYTES NFR BLD AUTO: 0.8 %
IMM GRANULOCYTES NFR BLD AUTO: 0.9 %
LYMPHOCYTES # BLD AUTO: 1.6 K/UL
LYMPHOCYTES # BLD AUTO: 2.3 K/UL
LYMPHOCYTES NFR BLD: 25.7 %
LYMPHOCYTES NFR BLD: 32.9 %
MAGNESIUM SERPL-MCNC: 1.8 MG/DL
MCH RBC QN AUTO: 31.8 PG
MCH RBC QN AUTO: 32.1 PG
MCHC RBC AUTO-ENTMCNC: 34.2 G/DL
MCHC RBC AUTO-ENTMCNC: 34.4 G/DL
MCV RBC AUTO: 93 FL
MCV RBC AUTO: 93 FL
MONOCYTES # BLD AUTO: 0.3 K/UL
MONOCYTES # BLD AUTO: 0.6 K/UL
MONOCYTES NFR BLD: 5.6 %
MONOCYTES NFR BLD: 8.8 %
NEUTROPHILS # BLD AUTO: 3.8 K/UL
NEUTROPHILS # BLD AUTO: 4.1 K/UL
NEUTROPHILS NFR BLD: 55 %
NEUTROPHILS NFR BLD: 66.5 %
NRBC BLD-RTO: 0 /100 WBC
NRBC BLD-RTO: 0 /100 WBC
NUM UNITS TRANS PACKED RBC: NORMAL
PLATELET # BLD AUTO: 212 K/UL
PLATELET # BLD AUTO: 234 K/UL
PMV BLD AUTO: 11 FL
PMV BLD AUTO: 11.1 FL
POTASSIUM SERPL-SCNC: 3.9 MMOL/L
PROT SERPL-MCNC: 5.4 G/DL
RBC # BLD AUTO: 2.55 M/UL
RBC # BLD AUTO: 3.02 M/UL
SODIUM SERPL-SCNC: 139 MMOL/L
WBC # BLD AUTO: 6.11 K/UL
WBC # BLD AUTO: 6.84 K/UL

## 2018-04-12 PROCEDURE — 25000003 PHARM REV CODE 250: Performed by: INTERNAL MEDICINE

## 2018-04-12 PROCEDURE — 36430 TRANSFUSION BLD/BLD COMPNT: CPT

## 2018-04-12 PROCEDURE — 80053 COMPREHEN METABOLIC PANEL: CPT

## 2018-04-12 PROCEDURE — 83735 ASSAY OF MAGNESIUM: CPT

## 2018-04-12 PROCEDURE — 25000003 PHARM REV CODE 250: Performed by: SURGERY

## 2018-04-12 PROCEDURE — G0378 HOSPITAL OBSERVATION PER HR: HCPCS

## 2018-04-12 PROCEDURE — 36415 COLL VENOUS BLD VENIPUNCTURE: CPT

## 2018-04-12 PROCEDURE — P9016 RBC LEUKOCYTES REDUCED: HCPCS

## 2018-04-12 PROCEDURE — 99212 OFFICE O/P EST SF 10 MIN: CPT | Mod: ,,, | Performed by: SURGERY

## 2018-04-12 PROCEDURE — 85025 COMPLETE CBC W/AUTO DIFF WBC: CPT

## 2018-04-12 RX ORDER — SUCRALFATE 1 G/10ML
1 SUSPENSION ORAL EVERY 6 HOURS
Status: DISCONTINUED | OUTPATIENT
Start: 2018-04-12 | End: 2018-04-12 | Stop reason: HOSPADM

## 2018-04-12 RX ORDER — HYDROCODONE BITARTRATE AND ACETAMINOPHEN 500; 5 MG/1; MG/1
TABLET ORAL
Status: DISCONTINUED | OUTPATIENT
Start: 2018-04-12 | End: 2018-04-12 | Stop reason: HOSPADM

## 2018-04-12 RX ORDER — SUCRALFATE 1 G/10ML
1 SUSPENSION ORAL EVERY 6 HOURS
Qty: 2400 ML | Refills: 2 | Status: SHIPPED | OUTPATIENT
Start: 2018-04-12 | End: 2018-11-26 | Stop reason: SDDI

## 2018-04-12 RX ORDER — HYDROCODONE BITARTRATE AND ACETAMINOPHEN 7.5; 325 MG/1; MG/1
1 TABLET ORAL EVERY 6 HOURS PRN
Qty: 12 TABLET | Refills: 0 | Status: SHIPPED | OUTPATIENT
Start: 2018-04-12 | End: 2018-05-21

## 2018-04-12 RX ORDER — PANTOPRAZOLE SODIUM 40 MG/1
40 TABLET, DELAYED RELEASE ORAL DAILY
Qty: 30 TABLET | Refills: 11 | Status: SHIPPED | OUTPATIENT
Start: 2018-04-13 | End: 2018-11-26 | Stop reason: SDDI

## 2018-04-12 RX ADMIN — SUCRALFATE 1 G: 1 SUSPENSION ORAL at 11:04

## 2018-04-12 RX ADMIN — SUCRALFATE 1 G: 1 SUSPENSION ORAL at 07:04

## 2018-04-12 RX ADMIN — HYDROCODONE BITARTRATE AND ACETAMINOPHEN 1 TABLET: 7.5; 325 TABLET ORAL at 08:04

## 2018-04-12 RX ADMIN — PANTOPRAZOLE SODIUM 40 MG: 40 TABLET, DELAYED RELEASE ORAL at 10:04

## 2018-04-12 NOTE — PROGRESS NOTES
"[facility] - Med Surg  General Surgery  Daily Note    Patient Name: Sandeep Hansen Jr.  MRN: [id number]  Admission Date: 4/9/2018  Attending Physician: Maldonado Dueñas MD -Now Kel BENJAMIN  Consult Physician: Christophe Garcia MD  Primary Care Provider: Primary Doctor No    Subjective:     Principle Problem: Anemia due to blood loss    Last 24 hour history:  No acute events in the last 24 hours.  H&H equilibration in the last 24 hours slightly lower than previous.  Patient given an additional 1 unit of blood with significant improvement in symptomatology of weakness.  Patient had a bowel movement today which was "normal ".  No blood or black stools.  No new complaints today    Objective:     Vital Signs (Most Recent):  Temp: 97.9 °F (36.6 °C) (04/12/18 1107)  Pulse: 74 (04/12/18 1107)  Resp: 18 (04/12/18 1107)  BP: 121/61 (04/12/18 1107)  SpO2: 98 % (04/12/18 1107) Vital Signs (24h Range):  Temp:  [96.6 °F (35.9 °C)-98.7 °F (37.1 °C)] 97.9 °F (36.6 °C)  Pulse:  [70-87] 74  Resp:  [18-21] 18  SpO2:  [97 %-100 %] 98 %  BP: (105-125)/(55-66) 121/61     Intake/Output last 24 hours:    Intake/Output Summary (Last 24 hours) at 04/12/18 1247  Last data filed at 04/12/18 1145   Gross per 24 hour   Intake              786 ml   Output                3 ml   Net              783 ml       I/O last 3 completed shifts:  In: 306 [Blood:306]  Out: -   I/O this shift:  In: 480 [P.O.:480]  Out: 3 [Urine:1; Stool:2]    Weight: 81.6 kg (180 lb)  Body mass index is 30.88 kg/m².    Gen: Wd Wn male currently in NAD  Heent: Nc/At, MMM  Eyes: Perrl, Eomi  Cv: RRR, no  M/g/r  Lung: Non-labored breathing, clear bilaterally  Abd: Soft, non-tender, non-distended    Significant Labs:  CBC:   Recent Labs  Lab 04/12/18 0115   WBC 6.84   RBC 2.55*   HGB 8.1*   HCT 23.7*      MCV 93   MCH 31.8*   MCHC 34.2     BMP:   Recent Labs  Lab 04/12/18 0115   GLU 99      K 3.9      CO2 27   BUN 12   CREATININE 0.9 "   CALCIUM 8.6*   MG 1.8     CMP:   Recent Labs  Lab 04/12/18  0115   GLU 99   CALCIUM 8.6*   ALBUMIN 3.1*   PROT 5.4*      K 3.9   CO2 27      BUN 12   CREATININE 0.9   ALKPHOS 41*   ALT 41   AST 32   BILITOT 0.1     LFTs:   Recent Labs  Lab 04/12/18  0115   ALT 41   AST 32   ALKPHOS 41*   BILITOT 0.1   PROT 5.4*   ALBUMIN 3.1*     Coagulation:   Recent Labs  Lab 04/09/18  1313   APTT 26.1     Specimen (12h ago through future)    None        No results for input(s): COLORU, CLARITYU, SPECGRAV, PHUR, PROTEINUA, GLUCOSEU, BILIRUBINCON, BLOODU, WBCU, RBCU, BACTERIA, MUCUS, NITRITE, LEUKOCYTESUR, UROBILINOGEN, HYALINECASTS in the last 168 hours.    Cultures:    Microbiology Results (last 7 days)     ** No results found for the last 168 hours. **          Significant Diagnostics:  No new    Assessment:   Sandeep Hansen Jr. is a 37 y.o. male who presents with Anemia due to blood loss.    Active Diagnoses:    Diagnosis Date Noted POA    PRINCIPAL PROBLEM:  Anemia due to blood loss [D50.0] 04/09/2018 Yes    Abdominal pain [R10.9] 04/09/2018 Yes     Chronic    Tobacco abuse [Z72.0] 04/09/2018 Yes     Chronic    GI bleed due to NSAIDs [K92.2, T39.395A] 04/09/2018 Yes      Problems Resolved During this Admission:    Diagnosis Date Noted Date Resolved POA     VTE Risk Mitigation         Ordered     IP VTE HIGH RISK PATIENT  Once      04/09/18 1631     Place sequential compression device  Until discontinued      04/09/18 1631          Medical Decision Making/Plan:  Patient doing well today.  I have discussed the patient's care with Dr. Begum.  I agree with discharge after repeat CBC.  Patient will follow-up in clinic with me in 2 weeks for pathology review and scheduling of EGD colonoscopy.  I will place his order in Epic.  We'll sign off now call with questions    Christophe Garcia MD  General Surgery  [facility] Surg

## 2018-04-12 NOTE — PLAN OF CARE
04/12/18 1452   Final Note   Assessment Type Final Discharge Note   Discharge Disposition Home   What phone number can be called within the next 1-3 days to see how you are doing after discharge? [phone]   Hospital Follow Up  Appt(s) scheduled? Yes   Discharge plans and expectations educations in teach back method with documentation complete? Yes   Followup appointment given to patient. Instructed to call the hospital to discuss financial assistance when he gets his bill.

## 2018-04-12 NOTE — DISCHARGE SUMMARY
"[facility]  Hospital Medicine  Discharge Summary      Patient Name: Sandeep Hansen Jr.  MRN: [id number]  Admission Date: 4/9/2018  Hospital Length of Stay: 1 days  Discharge Date and Time:  04/12/2018 1:52 PM  Attending Physician: Lesli Mahoney MD   Discharging Provider: Kasi Begum MD  Primary Care Provider: Primary Doctor No      HPI:   38 yo male with several weeks intermittent N/V without food relations.  Occ the emesis is dark.  2 weeks ago "strained" right lateral chest and has been taking Ibuprofen 800mg 2-3 x's per day.  Has noticed dark stools for last few weeks (very vague) with BM's about q72 hours.  No known PUD.  No weight loss.  Last night, fiance noted him very pale then diaphoretic with near syncopal event in shower (no LOC but minor face trauma).  Because of ongoing pain, presented to ER today.    Procedure(s) (LRB):  ESOPHAGOGASTRODUODENOSCOPY (EGD) (N/A)      Hospital Course:   36yo male admitted for abdominal pain due to duodenal ulcer.  He has been using ibuprofen, tobacco and etoh.  He did receive 2 units of PRBCs yesterday but only went up by one point.  He has been cleared by surgery but I would like to reassess H&H in am for stablity. His H&H did drop a little more over night.  He received 1 additional unit of PRBC.  This am his repeat H&H showed an appropriate response to the blood and he is stable.  Surgery has cleared him for discharge.     Consults:   Consults         Status Ordering Provider     Inpatient consult to General Surgery  Once     Provider:  Christophe Garcia MD    Completed LESLI MAHONEY          No new Assessment & Plan notes have been filed under this hospital service since the last note was generated.  Service: Hospital Medicine    Final Active Diagnoses:    Diagnosis Date Noted POA    PRINCIPAL PROBLEM:  Anemia due to blood loss [D50.0] 04/09/2018 Yes    Abdominal pain [R10.9] 04/09/2018 Yes     Chronic    Tobacco " abuse [Z72.0] 04/09/2018 Yes     Chronic      Problems Resolved During this Admission:    Diagnosis Date Noted Date Resolved POA    GI bleed due to NSAIDs [K92.2, T39.395A] 04/09/2018 04/12/2018 Yes       Discharged Condition: stable    Disposition: Home or Self Care    Follow Up:  Follow-up Information     Christophe Garcia MD. Go in 2 weeks.    Specialty:  General Surgery  Why:  Appointment time: 8:00am   Contact information:  [address] [place] [address] [phone]             Primary Doctor No.               Patient Instructions:   No discharge procedures on file.    Significant Diagnostic Studies:     Pending Diagnostic Studies:     None         Medications:  Reconciled Home Medications:      Medication List      START taking these medications    hydrocodone-acetaminophen 7.5-325mg 7.5-325 mg per tablet  Commonly known as:  NORCO  Take 1 tablet by mouth every 6 (six) hours as needed for Pain.     pantoprazole 40 MG tablet  Commonly known as:  PROTONIX  Take 1 tablet (40 mg total) by mouth once daily.  Start taking on:  4/13/2018     sucralfate 100 mg/mL suspension  Commonly known as:  CARAFATE  Take 10 mLs (1 g total) by mouth every 6 (six) hours.        STOP taking these medications    ibuprofen 200 MG tablet  Commonly known as:  ADVIL,MOTRIN            Indwelling Lines/Drains at time of discharge:   Lines/Drains/Airways     Airway                 Airway - Non-Surgical 04/10/18 1237 Nasal Cannula 2 days                Time spent on the discharge of patient: 35 minutes  Patient was seen and examined on the date of discharge and determined to be suitable for discharge.         Kasi Begum MD  Department of Hospital Medicine  [facility] S[facility]

## 2018-04-12 NOTE — DISCHARGE INSTRUCTIONS
BLAND DIET. ACTIVITY AS TOLERATED. TAKES MEDS AS PRESCRIBED. GO TO NEAREST ER/CALL 911 FOR ANY EMERGENCY.

## 2018-04-13 VITALS
HEART RATE: 74 BPM | WEIGHT: 180 LBS | RESPIRATION RATE: 18 BRPM | DIASTOLIC BLOOD PRESSURE: 61 MMHG | BODY MASS INDEX: 30.73 KG/M2 | SYSTOLIC BLOOD PRESSURE: 121 MMHG | HEIGHT: 64 IN | OXYGEN SATURATION: 98 % | TEMPERATURE: 98 F

## 2018-04-16 ENCOUNTER — TELEPHONE (OUTPATIENT)
Dept: SURGERY | Facility: CLINIC | Age: 38
End: 2018-04-16

## 2018-04-16 NOTE — TELEPHONE ENCOUNTER
----- Message from Tana Mckinney sent at 4/16/2018  9:57 AM CDT -----  Contact: self  Patient is calling in regarding his recent hospital stay at [facility].  Patient states he is still having abdominal pain. Please call patient at [phone] cell or [phone] work. Thanks!

## 2018-05-21 ENCOUNTER — HOSPITAL ENCOUNTER (EMERGENCY)
Facility: HOSPITAL | Age: 38
Discharge: HOME | End: 2018-05-21
Attending: FAMILY MEDICINE

## 2018-05-21 VITALS
OXYGEN SATURATION: 99 % | SYSTOLIC BLOOD PRESSURE: 144 MMHG | WEIGHT: 180 LBS | RESPIRATION RATE: 18 BRPM | HEART RATE: 71 BPM | DIASTOLIC BLOOD PRESSURE: 91 MMHG | BODY MASS INDEX: 29.99 KG/M2 | TEMPERATURE: 98 F | HEIGHT: 65 IN

## 2018-05-21 DIAGNOSIS — M54.9 BACK PAIN, UNSPECIFIED BACK LOCATION, UNSPECIFIED BACK PAIN LATERALITY, UNSPECIFIED CHRONICITY: Primary | ICD-10-CM

## 2018-05-21 LAB
ALBUMIN SERPL BCP-MCNC: 3.7 G/DL
ALP SERPL-CCNC: 74 U/L
ALT SERPL W/O P-5'-P-CCNC: 92 U/L
AMPHET+METHAMPHET UR QL: NEGATIVE
ANION GAP SERPL CALC-SCNC: 4 MMOL/L
APAP SERPL-MCNC: <10 UG/ML
AST SERPL-CCNC: 47 U/L
BARBITURATES UR QL SCN>200 NG/ML: NEGATIVE
BASOPHILS # BLD AUTO: 0.06 K/UL
BASOPHILS NFR BLD: 0.9 %
BENZODIAZ UR QL SCN>200 NG/ML: NEGATIVE
BILIRUB SERPL-MCNC: 0.5 MG/DL
BILIRUB UR QL STRIP: NEGATIVE
BUN SERPL-MCNC: 10 MG/DL
BZE UR QL SCN: NEGATIVE
CALCIUM SERPL-MCNC: 9 MG/DL
CANNABINOIDS UR QL SCN: NEGATIVE
CHLORIDE SERPL-SCNC: 111 MMOL/L
CLARITY UR: CLEAR
CO2 SERPL-SCNC: 23 MMOL/L
COLOR UR: YELLOW
CREAT SERPL-MCNC: 0.9 MG/DL
CREAT UR-MCNC: 48.3 MG/DL
DIFFERENTIAL METHOD: ABNORMAL
EOSINOPHIL # BLD AUTO: 0.1 K/UL
EOSINOPHIL NFR BLD: 1.4 %
ERYTHROCYTE [DISTWIDTH] IN BLOOD BY AUTOMATED COUNT: 13.1 %
EST. GFR  (AFRICAN AMERICAN): >60 ML/MIN/1.73 M^2
EST. GFR  (NON AFRICAN AMERICAN): >60 ML/MIN/1.73 M^2
ETHANOL SERPL-MCNC: <5 MG/DL
GLUCOSE SERPL-MCNC: 98 MG/DL
GLUCOSE UR QL STRIP: NEGATIVE
HCT VFR BLD AUTO: 37.3 %
HGB BLD-MCNC: 11.9 G/DL
HGB UR QL STRIP: NEGATIVE
IMM GRANULOCYTES # BLD AUTO: 0.02 K/UL
IMM GRANULOCYTES NFR BLD AUTO: 0.3 %
KETONES UR QL STRIP: NEGATIVE
LEUKOCYTE ESTERASE UR QL STRIP: NEGATIVE
LIPASE SERPL-CCNC: 21 U/L
LYMPHOCYTES # BLD AUTO: 1.4 K/UL
LYMPHOCYTES NFR BLD: 21.6 %
MCH RBC QN AUTO: 29.1 PG
MCHC RBC AUTO-ENTMCNC: 31.9 G/DL
MCV RBC AUTO: 91 FL
MONOCYTES # BLD AUTO: 0.5 K/UL
MONOCYTES NFR BLD: 8 %
NEUTROPHILS # BLD AUTO: 4.3 K/UL
NEUTROPHILS NFR BLD: 67.8 %
NITRITE UR QL STRIP: NEGATIVE
NRBC BLD-RTO: 0 /100 WBC
OPIATES UR QL SCN: NEGATIVE
PCP UR QL SCN>25 NG/ML: NEGATIVE
PH UR STRIP: 5 [PH] (ref 5–8)
PLATELET # BLD AUTO: 309 K/UL
PMV BLD AUTO: 10.8 FL
POTASSIUM SERPL-SCNC: 4.2 MMOL/L
PROT SERPL-MCNC: 7 G/DL
PROT UR QL STRIP: NEGATIVE
RBC # BLD AUTO: 4.09 M/UL
SALICYLATES SERPL-MCNC: <4 MG/DL
SODIUM SERPL-SCNC: 138 MMOL/L
SP GR UR STRIP: <=1.005 (ref 1–1.03)
TOXICOLOGY INFORMATION: NORMAL
URN SPEC COLLECT METH UR: NORMAL
UROBILINOGEN UR STRIP-ACNC: NEGATIVE EU/DL
WBC # BLD AUTO: 6.34 K/UL

## 2018-05-21 PROCEDURE — 80329 ANALGESICS NON-OPIOID 1 OR 2: CPT

## 2018-05-21 PROCEDURE — 96361 HYDRATE IV INFUSION ADD-ON: CPT

## 2018-05-21 PROCEDURE — 25000003 PHARM REV CODE 250: Performed by: FAMILY MEDICINE

## 2018-05-21 PROCEDURE — 85025 COMPLETE CBC W/AUTO DIFF WBC: CPT

## 2018-05-21 PROCEDURE — 80053 COMPREHEN METABOLIC PANEL: CPT

## 2018-05-21 PROCEDURE — 36415 COLL VENOUS BLD VENIPUNCTURE: CPT

## 2018-05-21 PROCEDURE — 96360 HYDRATION IV INFUSION INIT: CPT

## 2018-05-21 PROCEDURE — 80307 DRUG TEST PRSMV CHEM ANLYZR: CPT

## 2018-05-21 PROCEDURE — 81003 URINALYSIS AUTO W/O SCOPE: CPT | Mod: 59

## 2018-05-21 PROCEDURE — 80320 DRUG SCREEN QUANTALCOHOLS: CPT

## 2018-05-21 PROCEDURE — 83690 ASSAY OF LIPASE: CPT

## 2018-05-21 PROCEDURE — 99283 EMERGENCY DEPT VISIT LOW MDM: CPT | Mod: 25

## 2018-05-21 RX ORDER — SODIUM CHLORIDE 9 MG/ML
1000 INJECTION, SOLUTION INTRAVENOUS
Status: COMPLETED | OUTPATIENT
Start: 2018-05-21 | End: 2018-05-21

## 2018-05-21 RX ORDER — TRAMADOL HYDROCHLORIDE 50 MG/1
100 TABLET ORAL EVERY 6 HOURS PRN
Qty: 16 TABLET | Refills: 0 | Status: SHIPPED | OUTPATIENT
Start: 2018-05-21 | End: 2018-05-31

## 2018-05-21 RX ORDER — OMEPRAZOLE 10 MG/1
10 CAPSULE, DELAYED RELEASE ORAL DAILY
COMMUNITY
End: 2018-11-26 | Stop reason: SDDI

## 2018-05-21 RX ADMIN — SODIUM CHLORIDE 1000 ML: 900 INJECTION, SOLUTION INTRAVENOUS at 09:05

## 2018-05-21 NOTE — ED NOTES
Patient provided with discharge instructions. Patient instructed to follow up with PCP as directed, return to ED for new or worsening symptoms and to take medications as directed/prescribed. Patient has no questions or concerns at this time. Patient ambulatory out of ED to registration.

## 2018-05-21 NOTE — ED TRIAGE NOTES
Patient complains of bilateral flank pain, upper abdominal pain bilaterally, lightheadedness, and nausea for the past couple of weeks. Patient reports he was inpatient about a month ago with needing blood transfusions due to bleeding stomach ulcer. Rates pain 8/10. Respirations even and unlabored. No distress noted.

## 2018-05-21 NOTE — ED PROVIDER NOTES
Encounter Date: 5/21/2018       History     Chief Complaint   Patient presents with    Flank Pain     patient reports kidney stone on right side for the past couple years. onset of pain about 2 weeks ago.    Abdominal Pain     upper abd. bilateral, described as throbbing. onset of pain about 2 weeks ago.     Dizziness     onset for about a week    Nausea     37-year-old male presents complaining of bilateral flank pain states that it feels like all prior kidney stone attacks he denies any heavy lifting the pain is dull nonradiating worsens with twisting or flexing of the torso and the pain is intermittent for the past 3-4 days he has had prior episodes of epigastric discomfort which was attributed to ulcers he is currently prescribed Carafate and omeprazole but is not had those filled due to cost          Review of patient's allergies indicates:   Allergen Reactions    Toradol [ketorolac] Other (See Comments)     Chest pain      Past Medical History:   Diagnosis Date    Asthma     Kidney stones     Neurofibromatosis      Past Surgical History:   Procedure Laterality Date    LITHOTRIPSY Left      Family History   Problem Relation Age of Onset    Heart disease Mother      Social History   Substance Use Topics    Smoking status: Current Every Day Smoker     Packs/day: 2.00     Types: Cigarettes    Smokeless tobacco: Never Used    Alcohol use 1.2 oz/week     2 Standard drinks or equivalent per week      Comment: 2-3 crown's per night     Review of Systems   Constitutional: Negative for fever.   HENT: Negative for sore throat.    Respiratory: Negative for shortness of breath.    Cardiovascular: Negative for chest pain.   Gastrointestinal: Negative for nausea.   Genitourinary: Negative for dysuria.   Musculoskeletal: Negative for back pain.   Skin: Negative for rash.   Neurological: Negative for weakness.   Hematological: Does not bruise/bleed easily.       Physical Exam     Initial Vitals [05/21/18 0851]    BP Pulse Resp Temp SpO2   (!) 144/97 77 18 98.3 °F (36.8 °C) 97 %      MAP       112.67         Physical Exam    Nursing note and vitals reviewed.  Constitutional: He appears well-developed and well-nourished. He is not diaphoretic. No distress.   HENT:   Head: Normocephalic and atraumatic.   Right Ear: External ear normal.   Left Ear: External ear normal.   Nose: Nose normal.   Mouth/Throat: Oropharynx is clear and moist. No oropharyngeal exudate.   Eyes: EOM are normal.   Neck: Normal range of motion. Neck supple. No tracheal deviation present.   Cardiovascular: Normal rate and regular rhythm.   No murmur heard.  Pulmonary/Chest: Breath sounds normal. No stridor. No respiratory distress. He has no rales.   Abdominal: Soft. He exhibits no distension and no mass. There is no tenderness. There is no rebound.   Musculoskeletal: Normal range of motion. He exhibits no edema.   Lymphadenopathy:     He has no cervical adenopathy.   Neurological: He is alert and oriented to person, place, and time. He has normal strength.   Skin: Skin is warm and dry. Capillary refill takes less than 2 seconds. No pallor.   Psychiatric: He has a normal mood and affect.         ED Course   Procedures  Labs Reviewed - No data to display                               Clinical Impression:   The encounter diagnosis was Back pain, unspecified back location, unspecified back pain laterality, unspecified chronicity.                           Jordy Valdes MD  05/22/18 3580

## 2018-11-26 ENCOUNTER — HOSPITAL ENCOUNTER (EMERGENCY)
Facility: HOSPITAL | Age: 38
Discharge: HOME | End: 2018-11-26
Attending: FAMILY MEDICINE

## 2018-11-26 VITALS
RESPIRATION RATE: 15 BRPM | BODY MASS INDEX: 30.53 KG/M2 | HEIGHT: 66 IN | OXYGEN SATURATION: 98 % | HEART RATE: 76 BPM | TEMPERATURE: 98 F | SYSTOLIC BLOOD PRESSURE: 145 MMHG | DIASTOLIC BLOOD PRESSURE: 80 MMHG | WEIGHT: 190 LBS

## 2018-11-26 DIAGNOSIS — K52.9 ENTERITIS: Primary | ICD-10-CM

## 2018-11-26 LAB
ALBUMIN SERPL BCP-MCNC: 3.7 G/DL
ALP SERPL-CCNC: 87 U/L
ALT SERPL W/O P-5'-P-CCNC: 117 U/L
ANION GAP SERPL CALC-SCNC: 7 MMOL/L
AST SERPL-CCNC: 48 U/L
BASOPHILS # BLD AUTO: 0.06 K/UL
BASOPHILS NFR BLD: 0.8 %
BILIRUB SERPL-MCNC: 0.5 MG/DL
BUN SERPL-MCNC: 11 MG/DL
CALCIUM SERPL-MCNC: 9.2 MG/DL
CHLORIDE SERPL-SCNC: 106 MMOL/L
CO2 SERPL-SCNC: 24 MMOL/L
CREAT SERPL-MCNC: 0.8 MG/DL
DIFFERENTIAL METHOD: NORMAL
EOSINOPHIL # BLD AUTO: 0.1 K/UL
EOSINOPHIL NFR BLD: 0.7 %
ERYTHROCYTE [DISTWIDTH] IN BLOOD BY AUTOMATED COUNT: 13.2 %
EST. GFR  (AFRICAN AMERICAN): >60 ML/MIN/1.73 M^2
EST. GFR  (NON AFRICAN AMERICAN): >60 ML/MIN/1.73 M^2
GLUCOSE SERPL-MCNC: 89 MG/DL
HCT VFR BLD AUTO: 45.4 %
HGB BLD-MCNC: 14.9 G/DL
IMM GRANULOCYTES # BLD AUTO: 0.03 K/UL
IMM GRANULOCYTES NFR BLD AUTO: 0.4 %
INR PPP: 1.1
LIPASE SERPL-CCNC: 31 U/L
LYMPHOCYTES # BLD AUTO: 1.5 K/UL
LYMPHOCYTES NFR BLD: 20.4 %
MCH RBC QN AUTO: 30.6 PG
MCHC RBC AUTO-ENTMCNC: 32.8 G/DL
MCV RBC AUTO: 93 FL
MONOCYTES # BLD AUTO: 0.6 K/UL
MONOCYTES NFR BLD: 8.6 %
NEUTROPHILS # BLD AUTO: 5 K/UL
NEUTROPHILS NFR BLD: 69.1 %
NRBC BLD-RTO: 0 /100 WBC
OB PNL STL: NEGATIVE
PLATELET # BLD AUTO: 271 K/UL
PMV BLD AUTO: 10.6 FL
POTASSIUM SERPL-SCNC: 4.2 MMOL/L
PROT SERPL-MCNC: 7.2 G/DL
PROTHROMBIN TIME: 12.4 SEC
RBC # BLD AUTO: 4.87 M/UL
SODIUM SERPL-SCNC: 137 MMOL/L
WBC # BLD AUTO: 7.22 K/UL

## 2018-11-26 PROCEDURE — C9113 INJ PANTOPRAZOLE SODIUM, VIA: HCPCS | Performed by: FAMILY MEDICINE

## 2018-11-26 PROCEDURE — 96374 THER/PROPH/DIAG INJ IV PUSH: CPT

## 2018-11-26 PROCEDURE — 74176 CT ABD & PELVIS W/O CONTRAST: CPT | Mod: 26,,, | Performed by: RADIOLOGY

## 2018-11-26 PROCEDURE — 83690 ASSAY OF LIPASE: CPT

## 2018-11-26 PROCEDURE — 85610 PROTHROMBIN TIME: CPT

## 2018-11-26 PROCEDURE — 82272 OCCULT BLD FECES 1-3 TESTS: CPT

## 2018-11-26 PROCEDURE — 80053 COMPREHEN METABOLIC PANEL: CPT

## 2018-11-26 PROCEDURE — 25000003 PHARM REV CODE 250: Performed by: FAMILY MEDICINE

## 2018-11-26 PROCEDURE — 74176 CT ABD & PELVIS W/O CONTRAST: CPT | Mod: TC

## 2018-11-26 PROCEDURE — 85025 COMPLETE CBC W/AUTO DIFF WBC: CPT

## 2018-11-26 PROCEDURE — 63600175 PHARM REV CODE 636 W HCPCS: Performed by: FAMILY MEDICINE

## 2018-11-26 PROCEDURE — 96361 HYDRATE IV INFUSION ADD-ON: CPT

## 2018-11-26 PROCEDURE — 99284 EMERGENCY DEPT VISIT MOD MDM: CPT

## 2018-11-26 RX ORDER — PROMETHAZINE HYDROCHLORIDE 25 MG/1
50 TABLET ORAL EVERY 6 HOURS PRN
Qty: 12 TABLET | Refills: 0 | Status: SHIPPED | OUTPATIENT
Start: 2018-11-26 | End: 2019-04-29 | Stop reason: ALTCHOICE

## 2018-11-26 RX ORDER — PANTOPRAZOLE SODIUM 40 MG/10ML
40 INJECTION, POWDER, LYOPHILIZED, FOR SOLUTION INTRAVENOUS
Status: COMPLETED | OUTPATIENT
Start: 2018-11-26 | End: 2018-11-26

## 2018-11-26 RX ADMIN — SODIUM CHLORIDE 1000 ML: 9 INJECTION, SOLUTION INTRAVENOUS at 03:11

## 2018-11-26 RX ADMIN — PANTOPRAZOLE SODIUM 40 MG: 40 INJECTION, POWDER, LYOPHILIZED, FOR SOLUTION INTRAVENOUS at 03:11

## 2018-11-26 NOTE — ED TRIAGE NOTES
Started two days ago.  Pt reports black stool with some bright blood.  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 He now report dizziness with standing and walking.  Pt had bleeding ulcers back in March of this year.

## 2018-11-27 NOTE — ED PROVIDER NOTES
Encounter Date: 11/26/2018       History     Chief Complaint   Patient presents with    Abdominal Pain     38-year-old male presents complaining of abdominal cramps nausea vomiting diarrhea for the past 2 days there are no family members with similar symptoms he denies any cough chills headache blurred vision but has had some fatigue          Review of patient's allergies indicates:   Allergen Reactions    Toradol [ketorolac] Other (See Comments)     Chest pain      Past Medical History:   Diagnosis Date    Asthma     Kidney stones     Neurofibromatosis      Past Surgical History:   Procedure Laterality Date    ESOPHAGOGASTRODUODENOSCOPY (EGD) N/A 4/10/2018    [facility] by [facility] at [facility]    LITHOTRIPSY Left     tumor removed from left arm       Family History   Problem Relation Age of Onset    Heart disease Mother      Social History     Tobacco Use    Smoking status: Current Every Day Smoker     Packs/day: 0.50     Types: Cigarettes    Smokeless tobacco: Never Used   Substance Use Topics    Alcohol use: Yes     Alcohol/week: 1.2 oz     Types: 2 Standard drinks or equivalent per week     Comment: hx of liquor daily.     Drug use: No     Review of Systems   Constitutional: Negative for fever.   HENT: Negative for sore throat.    Respiratory: Negative for shortness of breath.    Cardiovascular: Negative for chest pain.   Gastrointestinal: Positive for abdominal pain. Negative for diarrhea and nausea.   Genitourinary: Negative for dysuria.   Musculoskeletal: Negative for back pain.   Skin: Negative for rash.   Neurological: Negative for weakness.   Hematological: Does not bruise/bleed easily.       Physical Exam     Initial Vitals [11/26/18 1438]   BP Pulse Resp Temp SpO2   (!) 170/99 86 16 97.7 °F (36.5 °C) 98 %      MAP       --         Physical Exam    Nursing note and vitals reviewed.  Constitutional: He appears well-developed and well-nourished. He is not diaphoretic. No  distress.   HENT:   Head: Normocephalic and atraumatic.   Right Ear: External ear normal.   Left Ear: External ear normal.   Nose: Nose normal.   Mouth/Throat: Oropharynx is clear and moist. No oropharyngeal exudate.   Eyes: EOM are normal.   Neck: Normal range of motion. Neck supple. No tracheal deviation present.   Cardiovascular: Normal rate and regular rhythm.   No murmur heard.  Pulmonary/Chest: Breath sounds normal. No stridor. No respiratory distress. He has no rales.   Abdominal: Soft. He exhibits no distension and no mass. There is no tenderness. There is no rebound.   Musculoskeletal: Normal range of motion. He exhibits no edema.   Lymphadenopathy:     He has no cervical adenopathy.   Neurological: He is alert and oriented to person, place, and time. He has normal strength.   Skin: Skin is warm and dry. Capillary refill takes less than 2 seconds. No pallor.   Psychiatric: He has a normal mood and affect.         ED Course   Procedures  Labs Reviewed   COMPREHENSIVE METABOLIC PANEL - Abnormal; Notable for the following components:       Result Value    AST 48 (*)      (*)     Anion Gap 7 (*)     All other components within normal limits   CBC W/ AUTO DIFFERENTIAL   LIPASE   PROTIME-INR   OCCULT BLOOD X 1, STOOL          Imaging Results          CT Abdomen Pelvis  Without Contrast (Final result)  Result time 11/26/18 15:36:12    Final result by Manish Giraldo MD (11/26/18 15:36:12)                 Impression:      1. Small nonobstructing right renal calculus.  2. Scattered diverticula without CT evidence for diverticulitis.  3. Small fat containing left inguinal hernia.  4. Small chronic cysts of the bony pelvis.      Electronically signed by: Manish Giraldo  Date:    11/26/2018  Time:    15:36             Narrative:    EXAMINATION:  CT ABDOMEN PELVIS WITHOUT CONTRAST    CLINICAL HISTORY:  Abdominal pain, unspecified;GI bleeding;    TECHNIQUE:  Low dose axial images, sagittal and coronal  reformations were obtained from the lung bases to the pubic symphysis.    COMPARISON:  CT abdomen and pelvis 10/01/2017 and 04/27/2015.    FINDINGS:  Lung bases are clear.  No pleural or pericardial effusions.  Heart size is normal.    Liver is normal in size and attenuation.  Spleen is top-normal in size and normal in attenuation.  Gallbladder is partially contracted.  No calcified gallstones.  Pancreas and adrenal glands are unremarkable.    Kidneys are normal in size and attenuation.  Small 5 mm nonobstructing right renal calculus.  No left renal calculi.  No changes of hydronephrosis.    Air and stool throughout the loops of colon.  Scattered diverticula within the colon.  No mesenteric inflammatory change.  No CT evidence for bowel obstruction.  Appendix is normal in caliber.    Bladder is partially distended.  Prostate, seminal vesicles and rectum unremarkable.    No significant mesenteric or retroperitoneal lymphadenopathy.    Small fat containing left inguinal hernia.    Bone window evaluation demonstrates small chronic cystic lesions of the pelvis.                                                      Clinical Impression:   The encounter diagnosis was Enteritis.                             Jordy Valdes MD  11/27/18 1211

## 2019-02-20 ENCOUNTER — HOSPITAL ENCOUNTER (OUTPATIENT)
Dept: RADIOLOGY | Facility: HOSPITAL | Age: 39
Discharge: HOME | End: 2019-02-20
Attending: NURSE PRACTITIONER
Payer: COMMERCIAL

## 2019-02-20 DIAGNOSIS — M25.511 RIGHT SHOULDER PAIN: Primary | ICD-10-CM

## 2019-02-20 DIAGNOSIS — M25.511 RIGHT SHOULDER PAIN: ICD-10-CM

## 2019-02-20 PROCEDURE — 73030 X-RAY EXAM OF SHOULDER: CPT | Mod: 26,RT,, | Performed by: RADIOLOGY

## 2019-02-20 PROCEDURE — 73030 X-RAY EXAM OF SHOULDER: CPT | Mod: TC,FY,RT

## 2019-02-20 PROCEDURE — 73030 XR SHOULDER COMPLETE 2 OR MORE VIEWS RIGHT: ICD-10-PCS | Mod: 26,RT,, | Performed by: RADIOLOGY

## 2019-02-21 ENCOUNTER — TELEPHONE (OUTPATIENT)
Dept: ORTHOPEDICS | Facility: CLINIC | Age: 39
End: 2019-02-21

## 2019-02-21 NOTE — TELEPHONE ENCOUNTER
Called patient to schedule referred appointment from [facility] with Dr. Lay for treatment of right shoulder pain. No answer at phone number [phone] and no voicemail setup. Unable to contact patient.

## 2019-03-27 DIAGNOSIS — B19.20 UNSPECIFIED VIRAL HEPATITIS C WITHOUT HEPATIC COMA: Primary | ICD-10-CM

## 2019-04-01 ENCOUNTER — HOSPITAL ENCOUNTER (OUTPATIENT)
Dept: RADIOLOGY | Facility: HOSPITAL | Age: 39
Discharge: HOME | End: 2019-04-01
Attending: NURSE PRACTITIONER
Payer: COMMERCIAL

## 2019-04-01 DIAGNOSIS — B19.20 UNSPECIFIED VIRAL HEPATITIS C WITHOUT HEPATIC COMA: ICD-10-CM

## 2019-04-01 PROCEDURE — 76700 US ABDOMEN COMPLETE: ICD-10-PCS | Mod: 26,,, | Performed by: RADIOLOGY

## 2019-04-01 PROCEDURE — 76700 US EXAM ABDOM COMPLETE: CPT | Mod: TC

## 2019-04-01 PROCEDURE — 76700 US EXAM ABDOM COMPLETE: CPT | Mod: 26,,, | Performed by: RADIOLOGY

## 2019-04-24 DIAGNOSIS — M25.512 LEFT SHOULDER PAIN, UNSPECIFIED CHRONICITY: Primary | ICD-10-CM

## 2019-04-29 ENCOUNTER — OFFICE VISIT (OUTPATIENT)
Dept: ORTHOPEDICS | Facility: CLINIC | Age: 39
End: 2019-04-29
Payer: COMMERCIAL

## 2019-04-29 ENCOUNTER — HOSPITAL ENCOUNTER (OUTPATIENT)
Dept: RADIOLOGY | Facility: HOSPITAL | Age: 39
Discharge: HOME | End: 2019-04-29
Attending: ORTHOPAEDIC SURGERY
Payer: COMMERCIAL

## 2019-04-29 VITALS
SYSTOLIC BLOOD PRESSURE: 129 MMHG | HEART RATE: 87 BPM | RESPIRATION RATE: 16 BRPM | BODY MASS INDEX: 30.54 KG/M2 | DIASTOLIC BLOOD PRESSURE: 72 MMHG | HEIGHT: 66 IN | WEIGHT: 190.06 LBS

## 2019-04-29 DIAGNOSIS — M75.81 ROTATOR CUFF TENDINITIS, RIGHT: ICD-10-CM

## 2019-04-29 DIAGNOSIS — M19.011 ARTHRITIS OF RIGHT ACROMIOCLAVICULAR JOINT: ICD-10-CM

## 2019-04-29 DIAGNOSIS — M75.21 TENDONITIS OF LONG HEAD OF BICEPS BRACHII OF RIGHT SHOULDER: Primary | ICD-10-CM

## 2019-04-29 DIAGNOSIS — M25.511 RIGHT SHOULDER PAIN, UNSPECIFIED CHRONICITY: ICD-10-CM

## 2019-04-29 DIAGNOSIS — M25.511 RIGHT SHOULDER PAIN, UNSPECIFIED CHRONICITY: Primary | ICD-10-CM

## 2019-04-29 DIAGNOSIS — M25.512 LEFT SHOULDER PAIN, UNSPECIFIED CHRONICITY: ICD-10-CM

## 2019-04-29 PROCEDURE — 20610 LARGE JOINT ASPIRATION/INJECTION: R GLENOHUMERAL: ICD-10-PCS | Mod: RT,S$GLB,, | Performed by: ORTHOPAEDIC SURGERY

## 2019-04-29 PROCEDURE — 99999 PR PBB SHADOW E&M-EST. PATIENT-LVL III: CPT | Mod: PBBFAC,,, | Performed by: ORTHOPAEDIC SURGERY

## 2019-04-29 PROCEDURE — 99204 PR OFFICE/OUTPT VISIT, NEW, LEVL IV, 45-59 MIN: ICD-10-PCS | Mod: 25,S$GLB,, | Performed by: ORTHOPAEDIC SURGERY

## 2019-04-29 PROCEDURE — 20610 DRAIN/INJ JOINT/BURSA W/O US: CPT | Mod: RT,S$GLB,, | Performed by: ORTHOPAEDIC SURGERY

## 2019-04-29 PROCEDURE — 3008F BODY MASS INDEX DOCD: CPT | Mod: CPTII,S$GLB,, | Performed by: ORTHOPAEDIC SURGERY

## 2019-04-29 PROCEDURE — 99204 OFFICE O/P NEW MOD 45 MIN: CPT | Mod: 25,S$GLB,, | Performed by: ORTHOPAEDIC SURGERY

## 2019-04-29 PROCEDURE — 73030 XR SHOULDER COMPLETE 2 OR MORE VIEWS RIGHT: ICD-10-PCS | Mod: 26,RT,, | Performed by: RADIOLOGY

## 2019-04-29 PROCEDURE — 73030 X-RAY EXAM OF SHOULDER: CPT | Mod: 26,RT,, | Performed by: RADIOLOGY

## 2019-04-29 PROCEDURE — 99999 PR PBB SHADOW E&M-EST. PATIENT-LVL III: ICD-10-PCS | Mod: PBBFAC,,, | Performed by: ORTHOPAEDIC SURGERY

## 2019-04-29 PROCEDURE — 73030 X-RAY EXAM OF SHOULDER: CPT | Mod: TC,FY,RT

## 2019-04-29 PROCEDURE — 3008F PR BODY MASS INDEX (BMI) DOCUMENTED: ICD-10-PCS | Mod: CPTII,S$GLB,, | Performed by: ORTHOPAEDIC SURGERY

## 2019-04-29 RX ORDER — TRIAMCINOLONE ACETONIDE 40 MG/ML
40 INJECTION, SUSPENSION INTRA-ARTICULAR; INTRAMUSCULAR
Status: DISCONTINUED | OUTPATIENT
Start: 2019-04-29 | End: 2019-04-29 | Stop reason: HOSPADM

## 2019-04-29 RX ORDER — IBUPROFEN 200 MG
200 TABLET ORAL EVERY 6 HOURS PRN
COMMUNITY
End: 2019-05-29

## 2019-04-29 RX ADMIN — TRIAMCINOLONE ACETONIDE 40 MG: 40 INJECTION, SUSPENSION INTRA-ARTICULAR; INTRAMUSCULAR at 10:04

## 2019-04-29 NOTE — PROGRESS NOTES
Subjective:      Patient ID: Sandeep Hansen Jr. is a 38 y.o. male.    Chief Complaint: Pain and Injury of the Left Shoulder    Referring Provider: Dept Physician Emergency  No address on file    HPI:  Mr. 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 He is unsure of the exact date of injury.  Abduction and forward flexion increases symptoms while warm compresses improve them. His symptoms do not awaken him at night.  He has taken NSAIDs with help.  He has not done physical therapy nor had injections.    Past Medical History:   Diagnosis Date    Asthma     Kidney stones      *  *  *  * Neurofibromatosis  GERD  Headaches  Stomach ulcers  Hepatitis-C      Past Surgical History:   Procedure Laterality Date    ESOPHAGOGASTRODUODENOSCOPY (EGD) N/A 4/10/2018    [facility]formed by [facility]    LITHOTRIPSY left kidney Left      * EXCISION NEUROFIBROMA LEFT UPPER ARM  COLONOSCOPY         Review of patient's allergies indicates:   Allergen Reactions    Toradol [ketorolac] Other (See Comments)     Chest pain        Social History     Occupational History         Tobacco Use    Smoking status: Current Every Day Smoker     Packs/day: 0.50     Types: Cigarettes    Smokeless tobacco: Never Used   Substance and Sexual Activity    Alcohol use: Yes     Alcohol/week: 1.2 oz     Types: 2 Standard drinks or equivalent per week     Comment: hx of liquor daily.     Drug use: No    Sexual activity: Yes     Partners: Female     Comment: fiance      Family History   Problem Relation Age of Onset    Heart disease Mother     Hypertension Mother     Heart disease Father     Hypertension Father     No Known Problems Brother     No Known Problems Son        Previous Hospitalizations:  Stomach  ulcers.    ROS:   Review of Systems   Constitution: Negative for chills and fever.   HENT: Negative for congestion.    Eyes: Negative for blurred vision.   Cardiovascular: Negative for chest pain.   Respiratory: Negative for cough.    Endocrine: Negative for polydipsia.   Hematologic/Lymphatic: Does not bruise/bleed easily.   Skin: Negative for skin cancer.   Musculoskeletal: Positive for joint pain and joint swelling. Negative for gout.   Gastrointestinal: Positive for heartburn. Negative for constipation and diarrhea.   Genitourinary: Negative for nocturia.   Neurological: Positive for headaches. Negative for seizures.   Psychiatric/Behavioral: Negative for depression.   Allergic/Immunologic: Negative for environmental allergies.           Objective:      Physical Exam:   General: AAOx3.  No acute distress  HEENT: Normocephalic, PEARLA EOMI, Good Dentition  Neck: Supple, No JVD  Chest: Symetric, equal excursion on inspiration  Abdomen: Soft NTND  Vascular:  Pulses intact and equal bilaterally.  Capillary refill less than 3 seconds and equal bilaterally  Neurologic:  Pinprick and soft touch intact and equal bilaterally  Integment:  No ecchymosis, no errythema.  Multiple tattoos.  Extremity:  Shoulder:  Forward flexion/abduction right shoulder 0/160 degrees active, left shoulder 0/179 degrees active.  Internal rotation right shoulder L1 left shoulder T10.  Negative drop-arm bilaterally. Negative lift-off bilaterally.  External rotation equal bilaterally 0/20°.  Full can mildly positive right shoulder.  Empty can mildly positive right shoulder.  Arroyo/Neer positive right shoulder.  Cross-arm negative bilaterally.  Nontender over the AC joint both shoulders.  Tender in the bicipital groove right shoulder.  Apprehension/relocation negative bilaterally.  Radiography:  Personally reviewed x-rays of the right shoulder completed on 04/29/2019 which showed a type 3 acromion and early AC arthritis.      Assessment:        Impression:      1. Tendonitis of long head of biceps brachii of right shoulder    2. Rotator cuff tendinitis, right    3. Arthritis of right acromioclavicular joint          Plan:       1.  Discussed physical examination and radiographic findings with the patient. Sandeep understands that he has rotator cuff and biceps tendinitis but since he has not completed conservative management recommend he trial conservative care and if he fails conservative care then consider surgical intervention.  2.  Offered a steroid injection to the right shoulder, he elected to proceed.  3.  Offered referred to physical therapy declined.  4.  Home exercises to include rotator cuff exercises and wall walking, Codman exercises, cane exercises, towel exercises were shown discussed.  5.  Would not recommend this patient take NSAIDs as he has a history of stomach ulcers.  6.  May use both shoulders as tolerated but avoid inciting activities.  7.  [facility] portal was discussed with the patient and information was given.  The patient was encouraged to use the portal for future encounters.  8.  Follow up p.r.n..  If he has not improved at next visit may consider referral for an MRI at that time.

## 2019-05-29 ENCOUNTER — HOSPITAL ENCOUNTER (EMERGENCY)
Facility: HOSPITAL | Age: 39
Discharge: HOME | End: 2019-05-29
Attending: EMERGENCY MEDICINE
Payer: COMMERCIAL

## 2019-05-29 VITALS
BODY MASS INDEX: 32.47 KG/M2 | DIASTOLIC BLOOD PRESSURE: 100 MMHG | HEIGHT: 66 IN | OXYGEN SATURATION: 98 % | WEIGHT: 202 LBS | SYSTOLIC BLOOD PRESSURE: 153 MMHG | TEMPERATURE: 98 F | HEART RATE: 83 BPM | RESPIRATION RATE: 20 BRPM

## 2019-05-29 DIAGNOSIS — S39.012A BACK STRAIN, INITIAL ENCOUNTER: Primary | ICD-10-CM

## 2019-05-29 LAB
BILIRUB UR QL STRIP: NEGATIVE
CLARITY UR: CLEAR
COLOR UR: YELLOW
GLUCOSE UR QL STRIP: NEGATIVE
HGB UR QL STRIP: NEGATIVE
KETONES UR QL STRIP: NEGATIVE
LEUKOCYTE ESTERASE UR QL STRIP: NEGATIVE
NITRITE UR QL STRIP: NEGATIVE
PH UR STRIP: 8 [PH] (ref 5–8)
PROT UR QL STRIP: NEGATIVE
SP GR UR STRIP: 1.02 (ref 1–1.03)
URN SPEC COLLECT METH UR: NORMAL
UROBILINOGEN UR STRIP-ACNC: NEGATIVE EU/DL

## 2019-05-29 PROCEDURE — 99282 EMERGENCY DEPT VISIT SF MDM: CPT

## 2019-05-29 PROCEDURE — 81003 URINALYSIS AUTO W/O SCOPE: CPT

## 2019-05-29 NOTE — ED PROVIDER NOTES
Encounter Date: 5/29/2019       History     Chief Complaint   Patient presents with    Flank Pain     right    Urinary Frequency     Pt c/o right side back pain and urinary frequency for a couple of days        Review of patient's allergies indicates:   Allergen Reactions    Toradol [ketorolac] Other (See Comments)     Chest pain      Past Medical History:   Diagnosis Date    Asthma     Kidney stones     Neurofibromatosis      Past Surgical History:   Procedure Laterality Date    ESOPHAGOGASTRODUODENOSCOPY (EGD) N/A 4/10/2018    [facility]    LITHOTRIPSY Left     tumor removed from left arm       Family History   Problem Relation Age of Onset    Heart disease Mother     Hypertension Mother     Heart disease Father     Hypertension Father     No Known Problems Brother     No Known Problems Son      Social History     Tobacco Use    Smoking status: Current Every Day Smoker     Packs/day: 0.50     Types: Cigarettes    Smokeless tobacco: Never Used   Substance Use Topics    Alcohol use: Yes     Alcohol/week: 1.2 oz     Types: 2 Standard drinks or equivalent per week     Comment: hx of liquor daily.     Drug use: No     Review of Systems   Genitourinary: Positive for flank pain and frequency.   Musculoskeletal: Positive for back pain.   All other systems reviewed and are negative.      Physical Exam     Initial Vitals [05/29/19 1836]   BP Pulse Resp Temp SpO2   (!) 153/100 83 20 98.3 °F (36.8 °C) 98 %      MAP       --         Physical Exam    Nursing note and vitals reviewed.  Constitutional: He appears well-developed and well-nourished.   HENT:   Head: Normocephalic.   Nose: Nose normal.   Eyes: Conjunctivae and EOM are normal. Pupils are equal, round, and reactive to light.   Neck: Normal range of motion. Neck supple.   Cardiovascular: Normal rate.   Pulmonary/Chest: Breath sounds normal.   Abdominal: Soft. Bowel sounds are normal.   Musculoskeletal: Normal  range of motion.        Arms:  Neurological: He is alert and oriented to person, place, and time. He has normal strength and normal reflexes.   Skin: Skin is warm and dry. Capillary refill takes 2 to 3 seconds.   Psychiatric: He has a normal mood and affect. His behavior is normal. Judgment and thought content normal.         ED Course   Procedures  Labs Reviewed - No data to display      Results for orders placed or performed during the hospital encounter of 05/29/19   Urinalysis, Reflex to Urine Culture Urine, Clean Catch   Result Value Ref Range    Specimen UA Urine, Clean Catch     Color, UA Yellow Yellow, Straw, Vera    Appearance, UA Clear Clear    pH, UA 8.0 5.0 - 8.0    Specific Gravity, UA 1.020 1.005 - 1.030    Protein, UA Negative Negative    Glucose, UA Negative Negative    Ketones, UA Negative Negative    Bilirubin (UA) Negative Negative    Occult Blood UA Negative Negative    Nitrite, UA Negative Negative    Urobilinogen, UA Negative Negative EU/dL    Leukocytes, UA Negative Negative         Imaging Results    None                               Clinical Impression:       ICD-10-CM ICD-9-CM   1. Back strain, initial encounter S39.012A 847.9                                Bonnie Gregory, [facility]  05/29/19 1921

## 2019-05-30 NOTE — DISCHARGE INSTRUCTIONS
Alternate Tylenol or Ibuprofen as directed for pain  Apply warm compresses  Follow up with Primary Physician  Return to Emergency Department for worsening symptoms

## 2019-06-27 ENCOUNTER — LAB VISIT (OUTPATIENT)
Dept: LAB | Facility: HOSPITAL | Age: 39
End: 2019-06-27
Attending: NURSE PRACTITIONER
Payer: COMMERCIAL

## 2019-06-27 DIAGNOSIS — B19.20 UNSPECIFIED VIRAL HEPATITIS C WITHOUT HEPATIC COMA: Primary | ICD-10-CM

## 2019-06-27 LAB
AFP SERPL-MCNC: 6.5 NG/ML (ref 0–8.4)
ALBUMIN SERPL BCP-MCNC: 3.4 G/DL (ref 3.5–5.2)
ALP SERPL-CCNC: 52 U/L (ref 55–135)
ALT SERPL W/O P-5'-P-CCNC: 84 U/L (ref 10–44)
AMPHET+METHAMPHET UR QL: NEGATIVE
ANION GAP SERPL CALC-SCNC: 9 MMOL/L (ref 8–16)
AST SERPL-CCNC: 39 U/L (ref 10–40)
BENZODIAZ UR QL SCN>200 NG/ML: NEGATIVE
BILIRUB SERPL-MCNC: 0.4 MG/DL (ref 0.1–1)
BUN SERPL-MCNC: 11 MG/DL (ref 6–20)
BZE UR QL SCN: NEGATIVE
CALCIUM SERPL-MCNC: 8.7 MG/DL (ref 8.7–10.5)
CANNABINOIDS UR QL SCN: NEGATIVE
CHLORIDE SERPL-SCNC: 110 MMOL/L (ref 95–110)
CO2 SERPL-SCNC: 22 MMOL/L (ref 23–29)
CREAT SERPL-MCNC: 1 MG/DL (ref 0.5–1.4)
CREAT UR-MCNC: 217.3 MG/DL (ref 23–375)
ERYTHROCYTE [DISTWIDTH] IN BLOOD BY AUTOMATED COUNT: 13.2 % (ref 11.5–14.5)
EST. GFR  (AFRICAN AMERICAN): >60 ML/MIN/1.73 M^2
EST. GFR  (NON AFRICAN AMERICAN): >60 ML/MIN/1.73 M^2
GLUCOSE SERPL-MCNC: 101 MG/DL (ref 70–110)
HCT VFR BLD AUTO: 41.9 % (ref 40–54)
HGB BLD-MCNC: 13.5 G/DL (ref 14–18)
INR PPP: 1 (ref 0.8–1.2)
MCH RBC QN AUTO: 31.8 PG (ref 27–31)
MCHC RBC AUTO-ENTMCNC: 32.2 G/DL (ref 32–36)
MCV RBC AUTO: 99 FL (ref 82–98)
OPIATES UR QL SCN: NORMAL
PCP UR QL SCN>25 NG/ML: NEGATIVE
PLATELET # BLD AUTO: 256 K/UL (ref 150–350)
PMV BLD AUTO: 10.2 FL (ref 9.2–12.9)
POTASSIUM SERPL-SCNC: 4.1 MMOL/L (ref 3.5–5.1)
PROT SERPL-MCNC: 6.3 G/DL (ref 6–8.4)
PROTHROMBIN TIME: 11.3 SEC (ref 9–12.5)
RBC # BLD AUTO: 4.24 M/UL (ref 4.6–6.2)
SODIUM SERPL-SCNC: 141 MMOL/L (ref 136–145)
TOXICOLOGY INFORMATION: NORMAL
TSH SERPL DL<=0.005 MIU/L-ACNC: 2.12 UIU/ML (ref 0.34–5.6)
WBC # BLD AUTO: 7.24 K/UL (ref 3.9–12.7)

## 2019-06-27 PROCEDURE — 86803 HEPATITIS C AB TEST: CPT

## 2019-06-27 PROCEDURE — 85610 PROTHROMBIN TIME: CPT

## 2019-06-27 PROCEDURE — 87902 NFCT AGT GNTYP ALYS HEP C: CPT

## 2019-06-27 PROCEDURE — 86705 HEP B CORE ANTIBODY IGM: CPT

## 2019-06-27 PROCEDURE — 82105 ALPHA-FETOPROTEIN SERUM: CPT

## 2019-06-27 PROCEDURE — 80053 COMPREHEN METABOLIC PANEL: CPT

## 2019-06-27 PROCEDURE — 83520 IMMUNOASSAY QUANT NOS NONAB: CPT

## 2019-06-27 PROCEDURE — 80307 DRUG TEST PRSMV CHEM ANLYZR: CPT

## 2019-06-27 PROCEDURE — 87340 HEPATITIS B SURFACE AG IA: CPT

## 2019-06-27 PROCEDURE — 85027 COMPLETE CBC AUTOMATED: CPT

## 2019-06-27 PROCEDURE — 87522 HEPATITIS C REVRS TRNSCRPJ: CPT

## 2019-06-27 PROCEDURE — 36415 COLL VENOUS BLD VENIPUNCTURE: CPT

## 2019-06-27 PROCEDURE — 86709 HEPATITIS A IGM ANTIBODY: CPT

## 2019-06-27 PROCEDURE — 84443 ASSAY THYROID STIM HORMONE: CPT

## 2019-06-28 LAB
HAV IGM SERPL QL IA: NEGATIVE
HBV CORE IGM SERPL QL IA: NEGATIVE
HBV SURFACE AG SERPL QL IA: NEGATIVE
HCV AB SERPL QL IA: POSITIVE

## 2019-07-03 LAB
FIBROSPECT II (PROMETHEUS): NORMAL
HCV GENTYP SERPL NAA+PROBE: NORMAL
HCV RNA SERPL NAA+PROBE-LOG IU: 4.79 LOG (10) IU/ML
HCV RNA SERPL QL NAA+PROBE: DETECTED IU/ML
HCV RNA SPEC NAA+PROBE-ACNC: ABNORMAL IU/ML

## 2020-03-11 ENCOUNTER — HOSPITAL ENCOUNTER (EMERGENCY)
Facility: HOSPITAL | Age: 40
Discharge: HOME | End: 2020-03-11
Attending: FAMILY MEDICINE
Payer: COMMERCIAL

## 2020-03-11 VITALS
RESPIRATION RATE: 20 BRPM | HEIGHT: 66 IN | BODY MASS INDEX: 33.75 KG/M2 | HEART RATE: 84 BPM | SYSTOLIC BLOOD PRESSURE: 118 MMHG | WEIGHT: 210 LBS | DIASTOLIC BLOOD PRESSURE: 78 MMHG | TEMPERATURE: 98 F | OXYGEN SATURATION: 100 %

## 2020-03-11 DIAGNOSIS — R10.9 ABDOMINAL PAIN, UNSPECIFIED ABDOMINAL LOCATION: Primary | ICD-10-CM

## 2020-03-11 LAB
ALBUMIN SERPL BCP-MCNC: 4.2 G/DL (ref 3.5–5.2)
ALP SERPL-CCNC: 64 U/L (ref 55–135)
ALT SERPL W/O P-5'-P-CCNC: 94 U/L (ref 10–44)
ANION GAP SERPL CALC-SCNC: 8 MMOL/L (ref 8–16)
AST SERPL-CCNC: 47 U/L (ref 10–40)
BASOPHILS # BLD AUTO: 0.06 K/UL (ref 0–0.2)
BASOPHILS NFR BLD: 0.7 % (ref 0–1.9)
BILIRUB SERPL-MCNC: 0.9 MG/DL (ref 0.1–1)
BILIRUB UR QL STRIP: NEGATIVE
BUN SERPL-MCNC: 15 MG/DL (ref 6–20)
CALCIUM SERPL-MCNC: 8.8 MG/DL (ref 8.7–10.5)
CHLORIDE SERPL-SCNC: 106 MMOL/L (ref 95–110)
CLARITY UR: CLEAR
CO2 SERPL-SCNC: 23 MMOL/L (ref 23–29)
COLOR UR: YELLOW
CREAT SERPL-MCNC: 1.1 MG/DL (ref 0.5–1.4)
DIFFERENTIAL METHOD: ABNORMAL
EOSINOPHIL # BLD AUTO: 0.1 K/UL (ref 0–0.5)
EOSINOPHIL NFR BLD: 0.8 % (ref 0–8)
ERYTHROCYTE [DISTWIDTH] IN BLOOD BY AUTOMATED COUNT: 12.2 % (ref 11.5–14.5)
EST. GFR  (AFRICAN AMERICAN): >60 ML/MIN/1.73 M^2
EST. GFR  (NON AFRICAN AMERICAN): >60 ML/MIN/1.73 M^2
GLUCOSE SERPL-MCNC: 98 MG/DL (ref 70–110)
GLUCOSE UR QL STRIP: NEGATIVE
HCT VFR BLD AUTO: 47.2 % (ref 40–54)
HGB BLD-MCNC: 15.7 G/DL (ref 14–18)
HGB UR QL STRIP: NEGATIVE
IMM GRANULOCYTES # BLD AUTO: 0.08 K/UL (ref 0–0.04)
IMM GRANULOCYTES NFR BLD AUTO: 0.9 % (ref 0–0.5)
KETONES UR QL STRIP: NEGATIVE
LEUKOCYTE ESTERASE UR QL STRIP: NEGATIVE
LYMPHOCYTES # BLD AUTO: 1.9 K/UL (ref 1–4.8)
LYMPHOCYTES NFR BLD: 21.5 % (ref 18–48)
MCH RBC QN AUTO: 31.8 PG (ref 27–31)
MCHC RBC AUTO-ENTMCNC: 33.3 G/DL (ref 32–36)
MCV RBC AUTO: 96 FL (ref 82–98)
MONOCYTES # BLD AUTO: 0.8 K/UL (ref 0.3–1)
MONOCYTES NFR BLD: 9.2 % (ref 4–15)
NEUTROPHILS # BLD AUTO: 5.8 K/UL (ref 1.8–7.7)
NEUTROPHILS NFR BLD: 66.9 % (ref 38–73)
NITRITE UR QL STRIP: NEGATIVE
NRBC BLD-RTO: 0 /100 WBC
PH UR STRIP: 6 [PH] (ref 5–8)
PLATELET # BLD AUTO: 255 K/UL (ref 150–350)
PMV BLD AUTO: 10.2 FL (ref 9.2–12.9)
POTASSIUM SERPL-SCNC: 3.9 MMOL/L (ref 3.5–5.1)
PROT SERPL-MCNC: 7.5 G/DL (ref 6–8.4)
PROT UR QL STRIP: NEGATIVE
RBC # BLD AUTO: 4.94 M/UL (ref 4.6–6.2)
SODIUM SERPL-SCNC: 137 MMOL/L (ref 136–145)
SP GR UR STRIP: 1.02 (ref 1–1.03)
URN SPEC COLLECT METH UR: NORMAL
UROBILINOGEN UR STRIP-ACNC: NEGATIVE EU/DL
WBC # BLD AUTO: 8.69 K/UL (ref 3.9–12.7)

## 2020-03-11 PROCEDURE — 96374 THER/PROPH/DIAG INJ IV PUSH: CPT

## 2020-03-11 PROCEDURE — 85025 COMPLETE CBC W/AUTO DIFF WBC: CPT

## 2020-03-11 PROCEDURE — 81003 URINALYSIS AUTO W/O SCOPE: CPT

## 2020-03-11 PROCEDURE — 74176 CT ABD & PELVIS W/O CONTRAST: CPT | Mod: TC

## 2020-03-11 PROCEDURE — 74176 CT ABDOMEN PELVIS WITHOUT CONTRAST: ICD-10-PCS | Mod: 26,,, | Performed by: RADIOLOGY

## 2020-03-11 PROCEDURE — 96375 TX/PRO/DX INJ NEW DRUG ADDON: CPT

## 2020-03-11 PROCEDURE — 74176 CT ABD & PELVIS W/O CONTRAST: CPT | Mod: 26,,, | Performed by: RADIOLOGY

## 2020-03-11 PROCEDURE — 96361 HYDRATE IV INFUSION ADD-ON: CPT

## 2020-03-11 PROCEDURE — 99284 EMERGENCY DEPT VISIT MOD MDM: CPT | Mod: 25

## 2020-03-11 PROCEDURE — 25000003 PHARM REV CODE 250: Performed by: FAMILY MEDICINE

## 2020-03-11 PROCEDURE — 63600175 PHARM REV CODE 636 W HCPCS: Performed by: FAMILY MEDICINE

## 2020-03-11 PROCEDURE — 80053 COMPREHEN METABOLIC PANEL: CPT

## 2020-03-11 RX ORDER — HYDROCODONE BITARTRATE AND ACETAMINOPHEN 10; 325 MG/1; MG/1
1 TABLET ORAL
Status: COMPLETED | OUTPATIENT
Start: 2020-03-11 | End: 2020-03-11

## 2020-03-11 RX ORDER — HYDROCODONE BITARTRATE AND ACETAMINOPHEN 10; 325 MG/1; MG/1
TABLET ORAL
Status: DISCONTINUED
Start: 2020-03-11 | End: 2020-03-12 | Stop reason: HOSPADM

## 2020-03-11 RX ORDER — ONDANSETRON 2 MG/ML
4 INJECTION INTRAMUSCULAR; INTRAVENOUS
Status: COMPLETED | OUTPATIENT
Start: 2020-03-11 | End: 2020-03-11

## 2020-03-11 RX ORDER — MORPHINE SULFATE 4 MG/ML
4 INJECTION, SOLUTION INTRAMUSCULAR; INTRAVENOUS
Status: COMPLETED | OUTPATIENT
Start: 2020-03-11 | End: 2020-03-11

## 2020-03-11 RX ADMIN — HYDROCODONE BITARTRATE AND ACETAMINOPHEN 1 TABLET: 10; 325 TABLET ORAL at 08:03

## 2020-03-11 RX ADMIN — ONDANSETRON 4 MG: 2 INJECTION INTRAMUSCULAR; INTRAVENOUS at 07:03

## 2020-03-11 RX ADMIN — SODIUM CHLORIDE 500 ML: 0.9 INJECTION, SOLUTION INTRAVENOUS at 07:03

## 2020-03-11 RX ADMIN — MORPHINE SULFATE 4 MG: 4 INJECTION, SOLUTION INTRAMUSCULAR; INTRAVENOUS at 07:03

## 2020-03-12 NOTE — ED PROVIDER NOTES
"Encounter Date: 3/11/2020       History     Chief Complaint   Patient presents with    Abdominal Pain     " having abdominal pain left side" symptoms since 3 days ago, Hx: ulcer     39-year-old male presents complaining left lateral abdominal discomfort he denies any nausea vomiting diarrhea he has had problems with prior kidney stones in the past he denies any trauma to the abdomen or diarrhea onset approximately 2 days ago        Review of patient's allergies indicates:   Allergen Reactions    Toradol [ketorolac] Other (See Comments)     Chest pain      Past Medical History:   Diagnosis Date    Asthma     Blood transfusion, without reported diagnosis     Kidney stones     Neurofibromatosis     Stomach ulcer      Past Surgical History:   Procedure Laterality Date    LITHOTRIPSY Left     tumor removed from left arm       Family History   Problem Relation Age of Onset    Heart disease Mother     Hypertension Mother     Heart disease Father     Hypertension Father     No Known Problems Brother     No Known Problems Son      Social History     Tobacco Use    Smoking status: Current Every Day Smoker     Packs/day: 0.50     Types: Cigarettes    Smokeless tobacco: Never Used   Substance Use Topics    Alcohol use: Yes     Alcohol/week: 2.0 standard drinks     Types: 2 Standard drinks or equivalent per week     Comment: hx of liquor daily.     Drug use: No     Review of Systems   Constitutional: Negative for fever.   HENT: Negative for sore throat.    Respiratory: Negative for shortness of breath.    Cardiovascular: Negative for chest pain.   Gastrointestinal: Negative for nausea.   Genitourinary: Negative for dysuria.   Musculoskeletal: Negative for back pain.   Skin: Negative for rash.   Neurological: Negative for weakness.   Hematological: Does not bruise/bleed easily.       Physical Exam     Initial Vitals [03/11/20 1822]   BP Pulse Resp Temp SpO2   129/88 109 20 98.7 °F (37.1 °C) 96 %      MAP     "   --         Physical Exam    Nursing note and vitals reviewed.  Constitutional: He appears well-developed and well-nourished. He is not diaphoretic. No distress.   HENT:   Head: Normocephalic and atraumatic.   Right Ear: External ear normal.   Left Ear: External ear normal.   Nose: Nose normal.   Mouth/Throat: Oropharynx is clear and moist. No oropharyngeal exudate.   Eyes: EOM are normal.   Neck: Normal range of motion. Neck supple. No tracheal deviation present.   Cardiovascular: Normal rate and regular rhythm.   No murmur heard.  Pulmonary/Chest: Breath sounds normal. No stridor. No respiratory distress. He has no rales.   Abdominal: Soft. He exhibits no distension and no mass. There is no tenderness. There is no rebound.   Musculoskeletal: Normal range of motion. He exhibits no edema.   Lymphadenopathy:     He has no cervical adenopathy.   Neurological: He is alert and oriented to person, place, and time. He has normal strength.   Skin: Skin is warm and dry. Capillary refill takes less than 2 seconds. No pallor.   Psychiatric: He has a normal mood and affect.         ED Course   Procedures  Labs Reviewed   COMPREHENSIVE METABOLIC PANEL - Abnormal; Notable for the following components:       Result Value    AST 47 (*)     ALT 94 (*)     All other components within normal limits   CBC W/ AUTO DIFFERENTIAL - Abnormal; Notable for the following components:    Mean Corpuscular Hemoglobin 31.8 (*)     Immature Granulocytes 0.9 (*)     Immature Grans (Abs) 0.08 (*)     All other components within normal limits   URINALYSIS, REFLEX TO URINE CULTURE    Narrative:     Preferred Collection Type->Urine, Clean Catch          Imaging Results          CT Abdomen Pelvis  Without Contrast (Final result)  Result time 03/11/20 19:02:48    Final result by Manish Bruno MD (03/11/20 19:02:48)                 Impression:      Bilateral nonobstructive nephrolithiasis.      Electronically signed by: Manish Bruno  MD  Date:    03/11/2020  Time:    19:02             Narrative:    EXAMINATION:  CT ABDOMEN PELVIS WITHOUT CONTRAST    CLINICAL HISTORY:  Abdominal pain, unspecified;    TECHNIQUE:  Low dose axial images, sagittal and coronal reformations were obtained from the lung bases to the pubic symphysis.  Oral contrast was not administered.    COMPARISON:  11/26/2018 and 04/27/2015    FINDINGS:  There is a 4 mm stone of the lower pole of the right kidney.  A 2 mm stone of the upper pole of the left kidney is present.    The liver, spleen, pancreas, and adrenal glands are unremarkable.    The small bowel is normal caliber.  The appendix is normal.  The colon is unremarkable.  There is a small fat containing left inguinal hernia.  The aorta is normal caliber.    There are several lucent foci scattered within both iliac bones, the largest of which measures 1.4 cm.  These are unchanged since April 2015 and are considered benign.                                                                 Clinical Impression:       ICD-10-CM ICD-9-CM   1. Abdominal pain, unspecified abdominal location R10.9 789.00             ED Disposition Condition    Discharge Stable        ED Prescriptions     None        Follow-up Information    None                                    Jordy Valdes MD  03/12/20 0440       Jordy Valdes MD  04/03/20 0541

## 2020-10-03 ENCOUNTER — HOSPITAL ENCOUNTER (EMERGENCY)
Facility: HOSPITAL | Age: 40
Discharge: HOME | End: 2020-10-03
Attending: INTERNAL MEDICINE
Payer: COMMERCIAL

## 2020-10-03 VITALS
RESPIRATION RATE: 18 BRPM | HEART RATE: 72 BPM | BODY MASS INDEX: 33.75 KG/M2 | DIASTOLIC BLOOD PRESSURE: 89 MMHG | SYSTOLIC BLOOD PRESSURE: 113 MMHG | HEIGHT: 66 IN | WEIGHT: 210 LBS | TEMPERATURE: 98 F | OXYGEN SATURATION: 98 %

## 2020-10-03 DIAGNOSIS — R09.1 PLEURISY: ICD-10-CM

## 2020-10-03 DIAGNOSIS — M94.0 COSTOCHONDRITIS, ACUTE: ICD-10-CM

## 2020-10-03 DIAGNOSIS — R07.89 CHEST WALL PAIN: Primary | ICD-10-CM

## 2020-10-03 DIAGNOSIS — R07.9 CHEST PAIN: ICD-10-CM

## 2020-10-03 LAB
ALBUMIN SERPL BCP-MCNC: 3.9 G/DL (ref 3.5–5.2)
ALP SERPL-CCNC: 82 U/L (ref 55–135)
ALT SERPL W/O P-5'-P-CCNC: 302 U/L (ref 10–44)
ANION GAP SERPL CALC-SCNC: 8 MMOL/L (ref 8–16)
AST SERPL-CCNC: 120 U/L (ref 10–40)
BASOPHILS # BLD AUTO: 0.07 K/UL (ref 0–0.2)
BASOPHILS NFR BLD: 1 % (ref 0–1.9)
BILIRUB SERPL-MCNC: 0.4 MG/DL (ref 0.1–1)
BUN SERPL-MCNC: 16 MG/DL (ref 6–20)
CALCIUM SERPL-MCNC: 8.5 MG/DL (ref 8.7–10.5)
CHLORIDE SERPL-SCNC: 107 MMOL/L (ref 95–110)
CO2 SERPL-SCNC: 23 MMOL/L (ref 23–29)
CREAT SERPL-MCNC: 0.9 MG/DL (ref 0.5–1.4)
CRP SERPL-MCNC: 1.76 MG/DL (ref 0–0.75)
DIFFERENTIAL METHOD: ABNORMAL
EOSINOPHIL # BLD AUTO: 0.3 K/UL (ref 0–0.5)
EOSINOPHIL NFR BLD: 3.9 % (ref 0–8)
ERYTHROCYTE [DISTWIDTH] IN BLOOD BY AUTOMATED COUNT: 12.6 % (ref 11.5–14.5)
EST. GFR  (AFRICAN AMERICAN): >60 ML/MIN/1.73 M^2
EST. GFR  (NON AFRICAN AMERICAN): >60 ML/MIN/1.73 M^2
GLUCOSE SERPL-MCNC: 95 MG/DL (ref 70–110)
HCT VFR BLD AUTO: 44.7 % (ref 40–54)
HGB BLD-MCNC: 14.5 G/DL (ref 14–18)
IMM GRANULOCYTES # BLD AUTO: 0.04 K/UL (ref 0–0.04)
IMM GRANULOCYTES NFR BLD AUTO: 0.6 % (ref 0–0.5)
LYMPHOCYTES # BLD AUTO: 1.5 K/UL (ref 1–4.8)
LYMPHOCYTES NFR BLD: 21.8 % (ref 18–48)
MCH RBC QN AUTO: 31.1 PG (ref 27–31)
MCHC RBC AUTO-ENTMCNC: 32.4 G/DL (ref 32–36)
MCV RBC AUTO: 96 FL (ref 82–98)
MONOCYTES # BLD AUTO: 0.8 K/UL (ref 0.3–1)
MONOCYTES NFR BLD: 11.4 % (ref 4–15)
NEUTROPHILS # BLD AUTO: 4.2 K/UL (ref 1.8–7.7)
NEUTROPHILS NFR BLD: 61.3 % (ref 38–73)
NRBC BLD-RTO: 0 /100 WBC
PLATELET # BLD AUTO: 254 K/UL (ref 150–350)
PMV BLD AUTO: 10.4 FL (ref 9.2–12.9)
POTASSIUM SERPL-SCNC: 4 MMOL/L (ref 3.5–5.1)
PROT SERPL-MCNC: 6.9 G/DL (ref 6–8.4)
RBC # BLD AUTO: 4.66 M/UL (ref 4.6–6.2)
SODIUM SERPL-SCNC: 138 MMOL/L (ref 136–145)
TROPONIN I SERPL DL<=0.01 NG/ML-MCNC: <0.01 NG/ML (ref 0.02–0.5)
WBC # BLD AUTO: 6.84 K/UL (ref 3.9–12.7)

## 2020-10-03 PROCEDURE — 99285 EMERGENCY DEPT VISIT HI MDM: CPT | Mod: 25

## 2020-10-03 PROCEDURE — 84484 ASSAY OF TROPONIN QUANT: CPT

## 2020-10-03 PROCEDURE — 80053 COMPREHEN METABOLIC PANEL: CPT

## 2020-10-03 PROCEDURE — 86140 C-REACTIVE PROTEIN: CPT

## 2020-10-03 PROCEDURE — 96372 THER/PROPH/DIAG INJ SC/IM: CPT

## 2020-10-03 PROCEDURE — 93005 ELECTROCARDIOGRAM TRACING: CPT

## 2020-10-03 PROCEDURE — 36415 COLL VENOUS BLD VENIPUNCTURE: CPT

## 2020-10-03 PROCEDURE — 85025 COMPLETE CBC W/AUTO DIFF WBC: CPT

## 2020-10-03 PROCEDURE — 63600175 PHARM REV CODE 636 W HCPCS: Performed by: INTERNAL MEDICINE

## 2020-10-03 PROCEDURE — 71046 XR CHEST PA AND LATERAL: ICD-10-PCS | Mod: 26,,, | Performed by: RADIOLOGY

## 2020-10-03 PROCEDURE — 71046 X-RAY EXAM CHEST 2 VIEWS: CPT | Mod: 26,,, | Performed by: RADIOLOGY

## 2020-10-03 PROCEDURE — 71046 X-RAY EXAM CHEST 2 VIEWS: CPT | Mod: TC,FY

## 2020-10-03 RX ORDER — DEXAMETHASONE SODIUM PHOSPHATE 10 MG/ML
10 INJECTION INTRAMUSCULAR; INTRAVENOUS
Status: COMPLETED | OUTPATIENT
Start: 2020-10-03 | End: 2020-10-03

## 2020-10-03 RX ORDER — PROMETHAZINE HYDROCHLORIDE 25 MG/ML
25 INJECTION, SOLUTION INTRAMUSCULAR; INTRAVENOUS
Status: COMPLETED | OUTPATIENT
Start: 2020-10-03 | End: 2020-10-03

## 2020-10-03 RX ORDER — METHYLPREDNISOLONE ACETATE 80 MG/ML
80 INJECTION, SUSPENSION INTRA-ARTICULAR; INTRALESIONAL; INTRAMUSCULAR; SOFT TISSUE
Status: COMPLETED | OUTPATIENT
Start: 2020-10-03 | End: 2020-10-03

## 2020-10-03 RX ADMIN — DEXAMETHASONE SODIUM PHOSPHATE 10 MG: 10 INJECTION INTRAMUSCULAR; INTRAVENOUS at 09:10

## 2020-10-03 RX ADMIN — METHYLPREDNISOLONE ACETATE 80 MG: 80 INJECTION, SUSPENSION INTRA-ARTICULAR; INTRALESIONAL; INTRAMUSCULAR; SOFT TISSUE at 09:10

## 2020-10-03 RX ADMIN — PROMETHAZINE HYDROCHLORIDE 25 MG: 25 INJECTION INTRAMUSCULAR; INTRAVENOUS at 09:10

## 2020-10-03 NOTE — ED PROVIDER NOTES
Encounter Date: 10/3/2020       History     Chief Complaint   Patient presents with    Chest Pain     left sided chest pain described as stabbing, began 30min pta, pain woke patient from sleep     Patient comes in with left-sided chest pain states stabbing.  Hurts when he moves and coughs and breathes.  No fever chills sweats.  Patient was doing a lot of painting yesterday.  Holding 5 lb bucket of paint.  No prior history of any cardiopulmonary problems.  None under treatment for any other problems.  Has history of asthma kidney stones no fibromatosis            Review of patient's allergies indicates:   Allergen Reactions    Toradol [ketorolac] Other (See Comments)     Chest pain      Past Medical History:   Diagnosis Date    Asthma     Blood transfusion, without reported diagnosis     Kidney stones     Neurofibromatosis     Stomach ulcer      Past Surgical History:   Procedure Laterality Date    LITHOTRIPSY Left     tumor removed from left arm       Family History   Problem Relation Age of Onset    Heart disease Mother     Hypertension Mother     Heart disease Father     Hypertension Father     No Known Problems Brother     No Known Problems Son      Social History     Tobacco Use    Smoking status: Current Every Day Smoker     Packs/day: 0.50     Types: Cigarettes    Smokeless tobacco: Never Used   Substance Use Topics    Alcohol use: Yes     Alcohol/week: 2.0 standard drinks     Types: 2 Standard drinks or equivalent per week     Comment: hx of liquor daily.     Drug use: No     Review of Systems   Constitutional: Negative for fever.   HENT: Negative for sore throat.    Respiratory: Negative for shortness of breath.    Cardiovascular: Positive for chest pain.   Gastrointestinal: Negative for nausea.   Genitourinary: Negative for dysuria.   Musculoskeletal: Negative for back pain.   Skin: Negative for rash.   Neurological: Negative for weakness.   Hematological: Does not bruise/bleed easily.    All other systems reviewed and are negative.      Physical Exam     Initial Vitals [10/03/20 0820]   BP Pulse Resp Temp SpO2   124/82 76 19 97.7 °F (36.5 °C) 99 %      MAP       --         Physical Exam    Nursing note and vitals reviewed.  Constitutional: Vital signs are normal. He appears well-developed and well-nourished. He is active and cooperative.   HENT:   Head: Normocephalic and atraumatic.   Eyes: Conjunctivae and lids are normal. Lids are everted and swept, no foreign bodies found.   Neck: Trachea normal, normal range of motion and full passive range of motion without pain. Neck supple.   Cardiovascular: Normal rate, regular rhythm, S1 normal, S2 normal, normal heart sounds, intact distal pulses and normal pulses.  No extrasystoles are present.    Pulmonary/Chest: Breath sounds normal.   Patient is very tender left chest wall.  None on the right.  Pectoralis major and minor especially the minor is very tender.  The subclavius muscle also very tender.  Slight slightly tender in the scaliness.  Chest is clear to A&P   Abdominal: Soft. Normal appearance and bowel sounds are normal.   Musculoskeletal: Normal range of motion.   Neurological: He is alert. He has normal reflexes. GCS eye subscore is 4. GCS verbal subscore is 5. GCS motor subscore is 6.   Skin: Skin is warm, dry and intact. Capillary refill takes less than 2 seconds.   Psychiatric: He has a normal mood and affect. His speech is normal and behavior is normal. Cognition and memory are normal.         ED Course   Procedures  Labs Reviewed   CBC W/ AUTO DIFFERENTIAL - Abnormal; Notable for the following components:       Result Value    Mean Corpuscular Hemoglobin 31.1 (*)     Immature Granulocytes 0.6 (*)     All other components within normal limits   COMPREHENSIVE METABOLIC PANEL - Abnormal; Notable for the following components:    Calcium 8.5 (*)      (*)      (*)     All other components within normal limits   TROPONIN I -  Abnormal; Notable for the following components:    Troponin I <0.01 (*)     All other components within normal limits   C-REACTIVE PROTEIN - Abnormal; Notable for the following components:    CRP 1.76 (*)     All other components within normal limits     EKG Readings: (Independently Interpreted)   Initial Reading: No STEMI. Rhythm: Normal Sinus Rhythm. Ectopy: No Ectopy. Conduction: Normal. T Waves: Normal. Axis: Normal. Clinical Impression: Normal Sinus Rhythm       Imaging Results          X-Ray Chest PA And Lateral (Final result)  Result time 10/03/20 08:37:50    Final result by Scar Swanson MD (10/03/20 08:37:50)                 Impression:      1. No acute cardiopulmonary abnormality appreciated radiographically.  Accentuation of the interstitial lung markings suggesting possible chronic interstitial lung disease, similar to the prior exam.  2. Subacute versus remote right anterior 4th rib fracture.  Please correlate clinically.      Electronically signed by: Omari Swanson MD  Date:    10/03/2020  Time:    08:37             Narrative:    EXAMINATION:  XR CHEST PA AND LATERAL    CLINICAL HISTORY:  Chest pain, unspecified    TECHNIQUE:  PA and lateral views of the chest were performed.    COMPARISON:  Chest x-ray-01/10/2018    FINDINGS:  The cardiac silhouette is not enlarged.  No central pulmonary vascular congestion.  There is accentuation of the interstitial lung markings bilaterally which could suggest chronic interstitial lung disease, similar in extent to the previous study.  No new zone of airspace consolidation or new pulmonary mass.  No significant volume of pleural fluid or pneumothorax.  There is a dextroscoliotic curvature of the thoracic spine.  There is degenerative change of the thoracic spine.  There is a subacute versus remote right 4th anterior rib fracture.                              X-Rays:   Independently Interpreted Readings:   Chest X-Ray: Normal heart size.  No infiltrates.  No  acute abnormalities. Normal vessels.     Medical Decision Making:   Clinical Tests:   Lab Tests: Ordered and Reviewed  The following lab test(s) were unremarkable: CBC and CMP       <> Summary of Lab: Patient has elevated CRP and he has elevated liver function studies.  Radiological Study: Ordered and Reviewed  Medical Tests: Ordered and Reviewed  ED Management:  Chest x-ray and EKG are normal.  Patient has a history of hepatitis C which is untreated.  States his liver is been inflamed in the past.  This accounts for the elevated liver function studies.  Feels that the patient has left chest wall pain secondary to working yesterday painting and strain of the chest wall.  Patient has had moderate drinking since he has been home but nothing that would be described as heavy period.  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  Is no sign of friction rub this is not appear to be pleurisy although it is possible and he is given Decadron Depo-Medrol to cover that possibility.  No signs of active infection.                             Clinical Impression:       ICD-10-CM ICD-9-CM   1. Chest wall pain  R07.89 786.52   2. Chest pain  R07.9 786.50   3. Costochondritis, acute  M94.0 733.6   4. Pleurisy  R09.1 511.0                      Disposition:   Disposition: Discharged  Condition: Stable                          Dajuan Mosqueda MD  10/03/20 0957

## 2021-01-11 ENCOUNTER — HOSPITAL ENCOUNTER (INPATIENT)
Age: 41
LOS: 4 days | Discharge: HOME | DRG: 540 | End: 2021-01-15
Attending: EMERGENCY MEDICINE
Payer: COMMERCIAL

## 2021-01-11 DIAGNOSIS — M86.041 ACUTE HEMATOGENOUS OSTEOMYELITIS OF RIGHT HAND (HCC): Primary | ICD-10-CM

## 2021-01-11 PROBLEM — M19.011 ARTHRITIS OF RIGHT ACROMIOCLAVICULAR JOINT: Status: ACTIVE | Noted: 2019-04-29

## 2021-01-11 PROBLEM — Z72.0 TOBACCO ABUSE: Status: ACTIVE | Noted: 2018-04-09

## 2021-01-11 PROBLEM — R03.0 ELEVATED SYSTOLIC BLOOD PRESSURE READING WITHOUT DIAGNOSIS OF HYPERTENSION: Status: ACTIVE | Noted: 2021-01-11

## 2021-01-11 PROBLEM — D50.0 ANEMIA DUE TO BLOOD LOSS: Status: ACTIVE | Noted: 2018-04-09

## 2021-01-11 PROBLEM — M86.9 OSTEOMYELITIS OF FINGER (HCC): Status: ACTIVE | Noted: 2021-01-11

## 2021-01-11 PROBLEM — Q85.00 NEUROFIBROMATOSIS (HCC): Status: ACTIVE | Noted: 2021-01-11

## 2021-01-11 LAB
ALBUMIN SERPL-MCNC: 4.5 G/DL (ref 3.5–5.2)
ALP BLD-CCNC: 92 U/L (ref 40–130)
ALT SERPL-CCNC: 95 U/L (ref 5–41)
ANION GAP SERPL CALCULATED.3IONS-SCNC: 9 MMOL/L (ref 7–19)
AST SERPL-CCNC: 39 U/L (ref 5–40)
BASOPHILS ABSOLUTE: 0.1 K/UL (ref 0–0.2)
BASOPHILS RELATIVE PERCENT: 1 % (ref 0–1)
BILIRUB SERPL-MCNC: <0.2 MG/DL (ref 0.2–1.2)
BUN BLDV-MCNC: 13 MG/DL (ref 6–20)
CALCIUM SERPL-MCNC: 9.2 MG/DL (ref 8.6–10)
CHLORIDE BLD-SCNC: 105 MMOL/L (ref 98–111)
CO2: 24 MMOL/L (ref 22–29)
CREAT SERPL-MCNC: 0.9 MG/DL (ref 0.5–1.2)
EOSINOPHILS ABSOLUTE: 0.2 K/UL (ref 0–0.6)
EOSINOPHILS RELATIVE PERCENT: 1.6 % (ref 0–5)
GFR AFRICAN AMERICAN: >59
GFR NON-AFRICAN AMERICAN: >60
GLUCOSE BLD-MCNC: 94 MG/DL (ref 74–109)
HCT VFR BLD CALC: 49.4 % (ref 42–52)
HEMOGLOBIN: 15.4 G/DL (ref 14–18)
IMMATURE GRANULOCYTES #: 0.1 K/UL
LACTIC ACID: 0.8 MMOL/L (ref 0.5–1.9)
LYMPHOCYTES ABSOLUTE: 2.2 K/UL (ref 1.1–4.5)
LYMPHOCYTES RELATIVE PERCENT: 22.8 % (ref 20–40)
MCH RBC QN AUTO: 30.8 PG (ref 27–31)
MCHC RBC AUTO-ENTMCNC: 31.2 G/DL (ref 33–37)
MCV RBC AUTO: 98.8 FL (ref 80–94)
MONOCYTES ABSOLUTE: 0.7 K/UL (ref 0–0.9)
MONOCYTES RELATIVE PERCENT: 7.3 % (ref 0–10)
NEUTROPHILS ABSOLUTE: 6.4 K/UL (ref 1.5–7.5)
NEUTROPHILS RELATIVE PERCENT: 66.3 % (ref 50–65)
PDW BLD-RTO: 12.7 % (ref 11.5–14.5)
PLATELET # BLD: 288 K/UL (ref 130–400)
PMV BLD AUTO: 10.5 FL (ref 9.4–12.4)
POTASSIUM SERPL-SCNC: 4.3 MMOL/L (ref 3.5–5)
RBC # BLD: 5 M/UL (ref 4.7–6.1)
SARS-COV-2, NAAT: NOT DETECTED
SODIUM BLD-SCNC: 138 MMOL/L (ref 136–145)
TOTAL PROTEIN: 7.5 G/DL (ref 6.6–8.7)
WBC # BLD: 9.6 K/UL (ref 4.8–10.8)

## 2021-01-11 PROCEDURE — 6370000000 HC RX 637 (ALT 250 FOR IP): Performed by: PHYSICIAN ASSISTANT

## 2021-01-11 PROCEDURE — 85025 COMPLETE CBC W/AUTO DIFF WBC: CPT

## 2021-01-11 PROCEDURE — 90471 IMMUNIZATION ADMIN: CPT | Performed by: EMERGENCY MEDICINE

## 2021-01-11 PROCEDURE — 6360000002 HC RX W HCPCS: Performed by: EMERGENCY MEDICINE

## 2021-01-11 PROCEDURE — 90715 TDAP VACCINE 7 YRS/> IM: CPT | Performed by: EMERGENCY MEDICINE

## 2021-01-11 PROCEDURE — 2580000003 HC RX 258: Performed by: EMERGENCY MEDICINE

## 2021-01-11 PROCEDURE — 6360000002 HC RX W HCPCS: Performed by: PHYSICIAN ASSISTANT

## 2021-01-11 PROCEDURE — 83605 ASSAY OF LACTIC ACID: CPT

## 2021-01-11 PROCEDURE — 2580000003 HC RX 258: Performed by: PHYSICIAN ASSISTANT

## 2021-01-11 PROCEDURE — 80053 COMPREHEN METABOLIC PANEL: CPT

## 2021-01-11 PROCEDURE — 1210000000 HC MED SURG R&B

## 2021-01-11 PROCEDURE — 87040 BLOOD CULTURE FOR BACTERIA: CPT

## 2021-01-11 PROCEDURE — 36415 COLL VENOUS BLD VENIPUNCTURE: CPT

## 2021-01-11 PROCEDURE — 96365 THER/PROPH/DIAG IV INF INIT: CPT

## 2021-01-11 PROCEDURE — 99283 EMERGENCY DEPT VISIT LOW MDM: CPT

## 2021-01-11 PROCEDURE — U0002 COVID-19 LAB TEST NON-CDC: HCPCS

## 2021-01-11 RX ORDER — SODIUM CHLORIDE 0.9 % (FLUSH) 0.9 %
10 SYRINGE (ML) INJECTION PRN
Status: DISCONTINUED | OUTPATIENT
Start: 2021-01-11 | End: 2021-01-15 | Stop reason: HOSPADM

## 2021-01-11 RX ORDER — ACETAMINOPHEN 650 MG/1
650 SUPPOSITORY RECTAL EVERY 6 HOURS PRN
Status: DISCONTINUED | OUTPATIENT
Start: 2021-01-11 | End: 2021-01-15 | Stop reason: HOSPADM

## 2021-01-11 RX ORDER — TETANUS AND DIPHTHERIA TOXOIDS ADSORBED 2; 2 [LF]/.5ML; [LF]/.5ML
0.5 INJECTION INTRAMUSCULAR ONCE
Status: DISCONTINUED | OUTPATIENT
Start: 2021-01-11 | End: 2021-01-11

## 2021-01-11 RX ORDER — HYDROCODONE BITARTRATE AND ACETAMINOPHEN 5; 325 MG/1; MG/1
1 TABLET ORAL EVERY 6 HOURS PRN
Status: DISCONTINUED | OUTPATIENT
Start: 2021-01-11 | End: 2021-01-15 | Stop reason: HOSPADM

## 2021-01-11 RX ORDER — POTASSIUM CHLORIDE 7.45 MG/ML
10 INJECTION INTRAVENOUS PRN
Status: DISCONTINUED | OUTPATIENT
Start: 2021-01-11 | End: 2021-01-15 | Stop reason: HOSPADM

## 2021-01-11 RX ORDER — POLYETHYLENE GLYCOL 3350 17 G/17G
17 POWDER, FOR SOLUTION ORAL DAILY PRN
Status: DISCONTINUED | OUTPATIENT
Start: 2021-01-11 | End: 2021-01-15 | Stop reason: HOSPADM

## 2021-01-11 RX ORDER — SODIUM CHLORIDE 0.9 % (FLUSH) 0.9 %
10 SYRINGE (ML) INJECTION EVERY 12 HOURS SCHEDULED
Status: DISCONTINUED | OUTPATIENT
Start: 2021-01-11 | End: 2021-01-15 | Stop reason: HOSPADM

## 2021-01-11 RX ORDER — HYDROCODONE BITARTRATE AND ACETAMINOPHEN 5; 325 MG/1; MG/1
2 TABLET ORAL EVERY 6 HOURS PRN
Status: DISCONTINUED | OUTPATIENT
Start: 2021-01-11 | End: 2021-01-15 | Stop reason: HOSPADM

## 2021-01-11 RX ORDER — SODIUM CHLORIDE 9 MG/ML
INJECTION, SOLUTION INTRAVENOUS CONTINUOUS
Status: DISCONTINUED | OUTPATIENT
Start: 2021-01-11 | End: 2021-01-15 | Stop reason: HOSPADM

## 2021-01-11 RX ORDER — ACETAMINOPHEN 325 MG/1
650 TABLET ORAL EVERY 6 HOURS PRN
Status: DISCONTINUED | OUTPATIENT
Start: 2021-01-11 | End: 2021-01-15 | Stop reason: HOSPADM

## 2021-01-11 RX ORDER — POTASSIUM CHLORIDE 20 MEQ/1
40 TABLET, EXTENDED RELEASE ORAL PRN
Status: DISCONTINUED | OUTPATIENT
Start: 2021-01-11 | End: 2021-01-15 | Stop reason: HOSPADM

## 2021-01-11 RX ORDER — MAGNESIUM SULFATE IN WATER 40 MG/ML
2 INJECTION, SOLUTION INTRAVENOUS PRN
Status: DISCONTINUED | OUTPATIENT
Start: 2021-01-11 | End: 2021-01-15 | Stop reason: HOSPADM

## 2021-01-11 RX ORDER — NICOTINE 21 MG/24HR
1 PATCH, TRANSDERMAL 24 HOURS TRANSDERMAL DAILY
Status: DISCONTINUED | OUTPATIENT
Start: 2021-01-12 | End: 2021-01-15 | Stop reason: HOSPADM

## 2021-01-11 RX ORDER — PROMETHAZINE HYDROCHLORIDE 25 MG/1
12.5 TABLET ORAL EVERY 6 HOURS PRN
Status: DISCONTINUED | OUTPATIENT
Start: 2021-01-11 | End: 2021-01-15 | Stop reason: HOSPADM

## 2021-01-11 RX ORDER — ONDANSETRON 2 MG/ML
4 INJECTION INTRAMUSCULAR; INTRAVENOUS EVERY 6 HOURS PRN
Status: DISCONTINUED | OUTPATIENT
Start: 2021-01-11 | End: 2021-01-15 | Stop reason: HOSPADM

## 2021-01-11 RX ADMIN — HYDROCODONE BITARTRATE AND ACETAMINOPHEN 2 TABLET: 5; 325 TABLET ORAL at 21:51

## 2021-01-11 RX ADMIN — PIPERACILLIN SODIUM AND TAZOBACTAM SODIUM 4.5 G: 4; .5 INJECTION, POWDER, LYOPHILIZED, FOR SOLUTION INTRAVENOUS at 19:54

## 2021-01-11 RX ADMIN — TETANUS TOXOID, REDUCED DIPHTHERIA TOXOID AND ACELLULAR PERTUSSIS VACCINE, ADSORBED 0.5 ML: 5; 2.5; 8; 8; 2.5 SUSPENSION INTRAMUSCULAR at 21:12

## 2021-01-11 RX ADMIN — Medication 10 ML: at 21:50

## 2021-01-11 RX ADMIN — SODIUM CHLORIDE: 9 INJECTION, SOLUTION INTRAVENOUS at 21:51

## 2021-01-11 RX ADMIN — VANCOMYCIN HYDROCHLORIDE 1000 MG: 10 INJECTION, POWDER, LYOPHILIZED, FOR SOLUTION INTRAVENOUS at 22:31

## 2021-01-11 ASSESSMENT — ENCOUNTER SYMPTOMS
SHORTNESS OF BREATH: 0
ABDOMINAL DISTENTION: 0
ABDOMINAL PAIN: 0

## 2021-01-11 ASSESSMENT — PAIN SCALES - GENERAL: PAINLEVEL_OUTOF10: 8

## 2021-01-12 ENCOUNTER — APPOINTMENT (OUTPATIENT)
Dept: GENERAL RADIOLOGY | Age: 41
DRG: 540 | End: 2021-01-12
Payer: COMMERCIAL

## 2021-01-12 LAB
ANION GAP SERPL CALCULATED.3IONS-SCNC: 10 MMOL/L (ref 7–19)
BASOPHILS ABSOLUTE: 0.1 K/UL (ref 0–0.2)
BASOPHILS RELATIVE PERCENT: 0.9 % (ref 0–1)
BUN BLDV-MCNC: 15 MG/DL (ref 6–20)
CALCIUM SERPL-MCNC: 8.7 MG/DL (ref 8.6–10)
CHLORIDE BLD-SCNC: 106 MMOL/L (ref 98–111)
CO2: 25 MMOL/L (ref 22–29)
CREAT SERPL-MCNC: 1 MG/DL (ref 0.5–1.2)
EOSINOPHILS ABSOLUTE: 0.2 K/UL (ref 0–0.6)
EOSINOPHILS RELATIVE PERCENT: 2.4 % (ref 0–5)
GFR AFRICAN AMERICAN: >59
GFR NON-AFRICAN AMERICAN: >60
GLUCOSE BLD-MCNC: 81 MG/DL (ref 74–109)
HCT VFR BLD CALC: 44.9 % (ref 42–52)
HEMOGLOBIN: 14.3 G/DL (ref 14–18)
IMMATURE GRANULOCYTES #: 0.1 K/UL
LYMPHOCYTES ABSOLUTE: 2.8 K/UL (ref 1.1–4.5)
LYMPHOCYTES RELATIVE PERCENT: 30.4 % (ref 20–40)
MCH RBC QN AUTO: 30.6 PG (ref 27–31)
MCHC RBC AUTO-ENTMCNC: 31.8 G/DL (ref 33–37)
MCV RBC AUTO: 96.1 FL (ref 80–94)
MONOCYTES ABSOLUTE: 1 K/UL (ref 0–0.9)
MONOCYTES RELATIVE PERCENT: 10.7 % (ref 0–10)
NEUTROPHILS ABSOLUTE: 5.1 K/UL (ref 1.5–7.5)
NEUTROPHILS RELATIVE PERCENT: 54.6 % (ref 50–65)
PDW BLD-RTO: 12.7 % (ref 11.5–14.5)
PLATELET # BLD: 253 K/UL (ref 130–400)
PMV BLD AUTO: 10.6 FL (ref 9.4–12.4)
POTASSIUM REFLEX MAGNESIUM: 4.6 MMOL/L (ref 3.5–5)
RBC # BLD: 4.67 M/UL (ref 4.7–6.1)
SODIUM BLD-SCNC: 141 MMOL/L (ref 136–145)
WBC # BLD: 9.3 K/UL (ref 4.8–10.8)

## 2021-01-12 PROCEDURE — 80048 BASIC METABOLIC PNL TOTAL CA: CPT

## 2021-01-12 PROCEDURE — 1210000000 HC MED SURG R&B

## 2021-01-12 PROCEDURE — 36415 COLL VENOUS BLD VENIPUNCTURE: CPT

## 2021-01-12 PROCEDURE — 6360000002 HC RX W HCPCS: Performed by: PHYSICIAN ASSISTANT

## 2021-01-12 PROCEDURE — 6370000000 HC RX 637 (ALT 250 FOR IP): Performed by: PHYSICIAN ASSISTANT

## 2021-01-12 PROCEDURE — 85025 COMPLETE CBC W/AUTO DIFF WBC: CPT

## 2021-01-12 PROCEDURE — 2580000003 HC RX 258: Performed by: PHYSICIAN ASSISTANT

## 2021-01-12 PROCEDURE — 73130 X-RAY EXAM OF HAND: CPT

## 2021-01-12 RX ADMIN — VANCOMYCIN HYDROCHLORIDE 1500 MG: 10 INJECTION, POWDER, LYOPHILIZED, FOR SOLUTION INTRAVENOUS at 22:28

## 2021-01-12 RX ADMIN — HYDROCODONE BITARTRATE AND ACETAMINOPHEN 2 TABLET: 5; 325 TABLET ORAL at 22:28

## 2021-01-12 RX ADMIN — PIPERACILLIN SODIUM AND TAZOBACTAM SODIUM 3.38 G: 3; .375 INJECTION, POWDER, LYOPHILIZED, FOR SOLUTION INTRAVENOUS at 13:42

## 2021-01-12 RX ADMIN — PIPERACILLIN SODIUM AND TAZOBACTAM SODIUM 3.38 G: 3; .375 INJECTION, POWDER, LYOPHILIZED, FOR SOLUTION INTRAVENOUS at 08:14

## 2021-01-12 RX ADMIN — HYDROCODONE BITARTRATE AND ACETAMINOPHEN 2 TABLET: 5; 325 TABLET ORAL at 06:05

## 2021-01-12 RX ADMIN — HYDROCODONE BITARTRATE AND ACETAMINOPHEN 2 TABLET: 5; 325 TABLET ORAL at 16:32

## 2021-01-12 RX ADMIN — ACETAMINOPHEN 650 MG: 325 TABLET ORAL at 10:30

## 2021-01-12 RX ADMIN — VANCOMYCIN HYDROCHLORIDE 1500 MG: 10 INJECTION, POWDER, LYOPHILIZED, FOR SOLUTION INTRAVENOUS at 10:24

## 2021-01-12 RX ADMIN — PIPERACILLIN SODIUM AND TAZOBACTAM SODIUM 3.38 G: 3; .375 INJECTION, POWDER, LYOPHILIZED, FOR SOLUTION INTRAVENOUS at 20:45

## 2021-01-12 RX ADMIN — PROMETHAZINE HYDROCHLORIDE 12.5 MG: 25 TABLET ORAL at 13:42

## 2021-01-12 RX ADMIN — PIPERACILLIN SODIUM AND TAZOBACTAM SODIUM 3.38 G: 3; .375 INJECTION, POWDER, LYOPHILIZED, FOR SOLUTION INTRAVENOUS at 02:27

## 2021-01-12 RX ADMIN — Medication 10 ML: at 08:14

## 2021-01-12 RX ADMIN — Medication 10 ML: at 20:46

## 2021-01-12 ASSESSMENT — PAIN DESCRIPTION - ONSET
ONSET: GRADUAL
ONSET: GRADUAL

## 2021-01-12 ASSESSMENT — PAIN - FUNCTIONAL ASSESSMENT
PAIN_FUNCTIONAL_ASSESSMENT: PREVENTS OR INTERFERES SOME ACTIVE ACTIVITIES AND ADLS
PAIN_FUNCTIONAL_ASSESSMENT: ACTIVITIES ARE NOT PREVENTED

## 2021-01-12 ASSESSMENT — PAIN DESCRIPTION - DESCRIPTORS: DESCRIPTORS: DISCOMFORT

## 2021-01-12 ASSESSMENT — PAIN DESCRIPTION - PROGRESSION
CLINICAL_PROGRESSION: GRADUALLY IMPROVING
CLINICAL_PROGRESSION: NOT CHANGED

## 2021-01-12 ASSESSMENT — PAIN SCALES - GENERAL
PAINLEVEL_OUTOF10: 7
PAINLEVEL_OUTOF10: 9
PAINLEVEL_OUTOF10: 5
PAINLEVEL_OUTOF10: 6
PAINLEVEL_OUTOF10: 6

## 2021-01-12 ASSESSMENT — PAIN DESCRIPTION - FREQUENCY: FREQUENCY: INTERMITTENT

## 2021-01-12 ASSESSMENT — PAIN DESCRIPTION - LOCATION
LOCATION: HAND;SHOULDER;BACK
LOCATION: HAND

## 2021-01-12 NOTE — PROGRESS NOTES
[id number] [facility]    Patient:  Kale Calderón  YOB: 1980  Date of Service: 1/12/2021  MRN: [id number]   Acct: [de-identified]   Primary Care Physician: No primary care provider on file. Advance Directive: Full Code  Admit Date: 1/11/2021       Hospital Day: 1  Portions of this note have been copied forward, however, changed to reflect the most current clinical status of this patient. CHIEF COMPLAINT Right thumb pain/swelling    SUBJECTIVE: Mr. Kayode Pinto complains of mild headache this morning. Pain is well controlled at present. Denies chest pain, dyspnea, N/V/D    Cumulative Hospital Course: The patient is a 45 y.o. male with PMH neurofibromatosis, GI bleed who presented to [facility] ED complaining of right thumb pain, erythema, edema that started about 1 week ago right before he got off the boat. He denies fever, body aches, chills. States pain has been worsening and is exacerbating when he attempts to bend thumb and states it feels stiff 2/2 edema. He cannot recall how he injured his thumb. He is right hand dominant. Mr. Kayode Pinto had a right hand radiograph today that is concerning for Osteomyelitis. He was admitted to Hospitalist service with consult placed to Orthopedic surgery. IV Vancomycin/Zosyn continued. Review of Systems:   14 point review of systems is negative except as specifically addressed above. Objective:   VITALS:  /76   Pulse 76   Temp 96.2 °F (35.7 °C) (Temporal)   Resp 16   Ht 5' 4\" (1.626 m)   Wt 202 lb 3.2 oz (91.7 kg)   SpO2 94%   BMI 34.71 kg/m²   24HR INTAKE/OUTPUT:      Intake/Output Summary (Last 24 hours) at 1/12/2021 0946  Last data filed at 1/12/2021 7654  Gross per 24 hour   Intake 799.58 ml   Output --   Net 799.58 ml     General appearance: alert, appears stated age, cooperative and no distress  Head: Normocephalic, without obvious abnormality, atraumatic  Eyes: conjunctivae/corneas clear. PERRL, EOM's intact.    Ears: normal external ears and nose, throat without exudate  Neck: no adenopathy, no carotid bruit, no JVD, supple, symmetrical, trachea midline   Lungs: clear to auscultation bilaterally,no rales or wheezes   Heart: regular rate and rhythm, S1, S2 normal, no murmur  Abdomen:soft, non-tender; non-distended, normal bowel sounds no masses, no organomegaly  Extremities:No lower extremity edema, erythema or tenderness to palpation. Right thumb with edema, erythema and TTP at IP joint though appears more localized today  Skin: Skin color, texture, turgor normal. No rashes or lesions  Lymphatic: No palpable lymph node enlargment  Neurologic: Alert and oriented X 3, normal strength and tone. No focal deficits  Psychiatric: Alert and oriented, thought content appropriate, normal insight, mood appropriate    Medications:      sodium chloride 75 mL/hr at 01/11/21 2151      sodium chloride flush  10 mL Intravenous 2 times per day    enoxaparin  40 mg Subcutaneous Daily    piperacillin-tazobactam  3.375 g Intravenous Q6H    nicotine  1 patch Transdermal Daily    vancomycin  1,500 mg Intravenous Q12H    vancomycin (VANCOCIN) intermittent dosing (placeholder)   Other RX Placeholder     sodium chloride flush, promethazine **OR** ondansetron, polyethylene glycol, acetaminophen **OR** acetaminophen, potassium chloride **OR** potassium alternative oral replacement **OR** potassium chloride, magnesium sulfate, HYDROcodone 5 mg - acetaminophen **OR** HYDROcodone 5 mg - acetaminophen  DIET GENERAL; Low Sodium (2 GM)     Lab and other Data:     Recent Labs     01/11/21 1950 01/12/21  0155   WBC 9.6 9.3   HGB 15.4 14.3    253     Recent Labs     01/11/21 1950 01/12/21  0155    141   K 4.3 4.6    106   CO2 24 25   BUN 13 15   CREATININE 0.9 1.0   GLUCOSE 94 81     Recent Labs     01/11/21 1950   AST 39   ALT 95*   BILITOT <0.2   ALKPHOS 92     RAD:   1.  Abnormal right thumb distal phalanx with absence of the tip of the  thumb and a diffusely mottled and irregular appearance of the distal  phalanx. Questionable cortical fracture noted.     Micro: Blood cultures pending    Assessment/Plan   Principal Problem:    Cellulitis right thumb concerning for Osteomyelitis of finger (HCC)-continue empiric IV antibiotics, plain film noted, Orthopedic surgery following    Active Problems:    Elevated systolic blood pressure reading without diagnosis of hypertension-BP improved with pain control, monitor      Neurofibromatosis (HCC)-noted      Tobacco abuse-Nicotine patch ordered    Further orders per clinical course/attending    Antibiotic: Vancomycin, Zosyn    DVT Prophylaxis: Jani Dawn PA-C

## 2021-01-12 NOTE — PROGRESS NOTES
4 Eyes Skin Assessment    Akanksha Cisneros is being assessed upon: Admission    I agree that I, Casey Walter, along with Jan Manuel (either 2 RN's or 1 LPN and 1 RN) have performed a thorough Head to Toe Skin Assessment on the patient. ALL assessment sites listed below have been assessed. Areas assessed by both nurses:     [x]   Head, Face, and Ears   [x]   Shoulders, Back, and Chest  [x]   Arms, Elbows, and Hands   [x]   Coccyx, Sacrum, and Ischium  [x]   Legs, Feet, and Heels    Does the Patient have Skin Breakdown?  No    Lei Prevention initiated: No  Wound Care Orders initiated: No    WOC nurse consulted for Pressure Injury (Stage 3,4, Unstageable, DTI, NWPT, and Complex wounds) and New or Established Ostomies: No        Primary Nurse eSignature: Casey Walter RN on 1/11/2021 at 9:56 PM      Co-Signer eSignature: {Esignature:[id number]}

## 2021-01-12 NOTE — PROGRESS NOTES
Manuel Amato arrived to room # 309. Presented with: osteomyelitis of right thumb  Mental Status: Patient is oriented, alert, coherent, logical, thought processes intact and able to concentrate and follow conversation. Vitals:    01/11/21 2139   BP: (!) 154/93   Pulse: 73   Resp: 16   Temp: 96.6 °F (35.9 °C)   SpO2: 96%     Patient safety contract and falls prevention contract reviewed with patient Yes. Oriented Patient to room. Call light within reach. Yes.   Needs, issues or concerns expressed at this time: no.      Electronically signed by Judie Azul RN on 1/11/2021 at 9:55 PM

## 2021-01-12 NOTE — ED PROVIDER NOTES
[facility]  eMERGENCY dEPARTMENT eNCOUnter      Pt Name: Juana James  MRN: [id number]  [place] 1980  Date of evaluation: 1/11/2021  Provider: Trixie Yao MD    [address] [place] [address]       Chief Complaint   Patient presents with    Hand Pain     right thumb, infection         HISTORY OF PRESENT ILLNESS   (Location/Symptom, Timing/Onset,Context/Setting, Quality, Duration, Modifying Factors, Severity)  Note limiting factors. Juana James is a 45 y.o. male who presents to the emergency department for evaluation regarding swelling and pain involving his right thumb. Patient states the symptoms been ongoing for approximate the past 1 week. States that seems to be getting progressively worse. He describes the pain is sharp in nature, increasing when he attempts to flex at the IP joint. He did not sustain any specific injury to the thumb. No prior history of similar events. He underwent an outpatient radiograph today and was sent here to the ED for further evaluation. He has not had fevers, chills or vomiting. HPI    NursingNotes were reviewed. REVIEW OF SYSTEMS    (2-9 systems for level 4, 10 or more for level 5)     Review of Systems   Constitutional: Negative for chills and fever. Respiratory: Negative for shortness of breath. Cardiovascular: Negative for chest pain. Gastrointestinal: Negative for abdominal distention and abdominal pain. Skin: Positive for wound (right thumb). All other systems reviewed and are negative. PAST MEDICALHISTORY   No past medical history on file. SURGICAL HISTORY     No past surgical history on file. CURRENT MEDICATIONS     Previous Medications    No medications on file       ALLERGIES     Toradol [ketorolac tromethamine]    FAMILY HISTORY     No family history on file.        SOCIAL HISTORY       Social History     Socioeconomic History    Marital status:      Spouse name: Not on file    Number of children: Not on file    Years of education: Not on file    Highest education level: Not on file   Occupational History    Not on file   Social Needs    Financial resource strain: Not on file    Food insecurity     Worry: Not on file     Inability: Not on file    Transportation needs     Medical: Not on file     Non-medical: Not on file   Tobacco Use    Smoking status: Not on file   Substance and Sexual Activity    Alcohol use: Not on file    Drug use: Not on file    Sexual activity: Not on file   Lifestyle    Physical activity     Days per week: Not on file     Minutes per session: Not on file    Stress: Not on file   Relationships    Social connections     Talks on phone: Not on file     Gets together: Not on file     Attends Religion service: Not on file     Active member of club or organization: Not on file     Attends meetings of clubs or organizations: Not on file     Relationship status: Not on file    Intimate partner violence     Fear of current or ex partner: Not on file     Emotionally abused: Not on file     Physically abused: Not on file     Forced sexual activity: Not on file   Other Topics Concern    Not on file   Social History Narrative    Not on file       SCREENINGS             PHYSICAL EXAM    (up to 7 for level 4, 8 or more for level 5)     ED Triage Vitals [01/11/21 1743]   BP Temp Temp Source Pulse Resp SpO2 Height Weight   (!) 140/100 97.6 °F (36.4 °C) Oral 93 21 97 % 5' 4\" (1.626 m) 208 lb (94.3 kg)       Physical Exam  Vitals signs and nursing note reviewed. Constitutional:       General: He is not in acute distress. HENT:      Head: Atraumatic. Mouth/Throat:      Mouth: Mucous membranes are moist. Mucous membranes are not dry. Cardiovascular:      Rate and Rhythm: Normal rate and regular rhythm. Pulses: Normal pulses. Heart sounds: Normal heart sounds. Pulmonary:      Effort: Pulmonary effort is normal. No respiratory distress. Breath sounds: Normal breath sounds. Abdominal:      Palpations: Abdomen is soft. Tenderness: There is no abdominal tenderness. Musculoskeletal:        Hands:       Comments: Erythema and fullness with fluctuance noted over the right thumb. No evidence of lymphangitis in RUE. Skin:     Capillary Refill: Capillary refill takes less than 2 seconds. Neurological:      General: No focal deficit present. Mental Status: He is alert and oriented to person, place, and time. Psychiatric:         Mood and Affect: Mood normal.         DIAGNOSTIC RESULTS       RADIOLOGY:  Non-plain film images such as CT, Ultrasound and MRI are read by the radiologist. Plain radiographic images are visualized and preliminarily interpreted bythe emergency physician with the below findings:    Radiograph of the right thumb was reviewed with attending radiology who felt this was very consistent with acute osteomyelitis. No orders to display           LABS:  Labs Reviewed   CBC WITH AUTO DIFFERENTIAL - Abnormal; Notable for the following components:       Result Value    MCV 98.8 (*)     MCHC 31.2 (*)     Neutrophils % 66.3 (*)     All other components within normal limits   CULTURE, BLOOD 1   CULTURE, BLOOD 2   COMPREHENSIVE METABOLIC PANEL   LACTIC ACID, PLASMA   COVID-19   COVID-19   COVID-19       All other labs were within normal range or not returned as of this dictation. EMERGENCY DEPARTMENT COURSE and DIFFERENTIAL DIAGNOSIS/MDM:   Vitals:    Vitals:    01/11/21 1743   BP: (!) 140/100   Pulse: 93   Resp: 21   Temp: 97.6 °F (36.4 °C)   TempSrc: Oral   SpO2: 97%   Weight: 208 lb (94.3 kg)   Height: 5' 4\" (1.626 m)       MDM    Reassessment    Radiograph reveals evidence of osteomyelitis. He is underwent blood cultures here in the ED. Provided with IV antibiotics, Zosyn and vancomycin. He'll require inpatient admission along with orthopedic/hand surgery consultation.       CONSULTS:    Case was discussed with Dr. Valdo Nguyen regarding orthopedic consultation. Case discussed with Dr. Kecia Hale regarding admission to the hospitalist service. PROCEDURES:  Unless otherwise noted below, none     Procedures    FINAL IMPRESSION      1.  Acute hematogenous osteomyelitis of right hand [facility])          DISPOSITION/PLAN   DISPOSITION Decision To Admit 01/11/2021 08:13:39 PM      (Please note that portions of this note were completed with a voice recognition program.  Efforts were made to edit thedictations but occasionally words are mis-transcribed.)    Cole Toussaint MD (electronically signed)  Attending Emergency Physician          Cole Toussaint MD  01/11/21 0833

## 2021-01-12 NOTE — PROGRESS NOTES
Pharmacy Note  Vancomycin Consult    Ayden Null is a 45 y.o. male started on Vancomycin for Skin/Soft Tissue Infection; consult received from Mario Zuniga to manage therapy. Also receiving the following antibiotics: Zosyn. Principal Problem:    Osteomyelitis of finger (HCC)  Active Problems:    Elevated systolic blood pressure reading without diagnosis of hypertension    Neurofibromatosis ([address]    Tobacco abuse  Resolved Problems:    * No resolved hospital problems. *      Allergies: Toradol [ketorolac tromethamine]     Temp max: 97.6    Recent Labs     01/11/21 1950   BUN 13       Recent Labs     01/11/21 1950   CREATININE 0.9       Recent Labs     01/11/21 1950   WBC 9.6       No intake or output data in the 24 hours ending 01/12/21 0201    No current cultures at this time  Culture Date Source Results                      Ht Readings from Last 1 Encounters:   01/11/21 5' 4\" (1.626 m)        Wt Readings from Last 1 Encounters:   01/11/21 202 lb 3.2 oz (91.7 kg)         Body mass index is 34.71 kg/m². Estimated Creatinine Clearance: 113 mL/min (based on SCr of 0.9 mg/dL). Assessment/Plan:  Will initiate vancomycin 1500 mg IV every 12 hours. Timing of trough level will be determined based on culture results, renal function, and clinical response. Thank you for the consult. Will continue to follow.     Electronically signed by Anil Mcclelland [facility] on 1/12/2021 at 2:01 AM

## 2021-01-12 NOTE — H&P
[employer] - History & Physical      PCP: No primary care provider on file. Date of Admission: 1/11/2021    Date of Service: 1/11/2021    Chief Complaint:  Right thumb pain/swelling    History Of Present Illness: The patient is a 45 y.o. male with PMH neurofibromatosis, GI bleed who presented to [facility] ED complaining of right thumb pain, erythema, edema that started about 1 week ago right [place] the boat. He denies fever, body aches, chills. States pain has been worsening and is exacerbating when he attempts to bend thumb and states it feels stiff 2/2 edema. He cannot recall how he injured his thumb. He is right hand dominant. Mr. Mounika Martinez had a right hand radiograph today that is concerning for Osteomyelitis. Past Medical History:        Diagnosis Date    GI bleed     Kidney stones     Neurofibromatosis [facility])      Past Surgical History:        Procedure Laterality Date    LITHOTRIPSY      TUMOR REMOVAL       Home Medications:  Prior to Admission medications    Not on File     Allergies: Toradol [ketorolac tromethamine]    Social History:    The patient currently lives at home  Tobacco:   has no history on file for tobacco.  Alcohol:   has no history on file for alcohol.   Illicit Drugs: denies    Family History:      Problem Relation Age of Onset    Hypertension Mother     Neurofibromatosis Mother     No Known Problems Father      Review of Systems:   Constitutional / general:  Denies fever / chills / sweats  Head:  Denies headache / neck stiffness / trauma / visual change  Eyes:  Denies blurry vision / acute visual change or loss / itching / redness  ENT: Denies sore throat / hoarseness / nasal drainage / ear pain  CV:  Denies chest pain / palpitations/ orthopnea   Respiratory:  Denies cough / shortness of breath / sputum / hemoptysis  GI: Denies nausea / vomiting / abdominal pain / diarrhea / constipation  :  Denies dysuria / hesitancy / urgency / hematuria Neuro: Denies paralysis / syncope / seizure / dysphagia / headache / paresthesias  Musculoskeletal:  Denies muscle weakness /+joint stiffness /+ pain  Vascular: Denies edema / claudication / varicosities  Heme / endocrine: Denies easy bruising / bleeding / excessive sweating / heat or cold intolerance  Psychiatric:  Denies depression / anxiety / insomnia / mood changes  Skin:  Denies new rashes / lesions / skin hair or nail changes    14 point review of systems is negative except as specifically addressed above. Physical Examination:  BP (!) 140/100   Pulse 93   Temp 97.6 °F (36.4 °C) (Oral)   Resp 21   Ht 5' 4\" (1.626 m)   Wt 208 lb (94.3 kg)   SpO2 97%   BMI 35.70 kg/m²   General appearance: alert, appears stated age, cooperative and no distress  Head: Normocephalic, without obvious abnormality, atraumatic  Eyes: conjunctivae/corneas clear. PERRL, EOM's intact. Ears: normal external ears and nose, throat without exudate  Neck: no adenopathy, no carotid bruit, no JVD, supple, symmetrical, trachea midline   Lungs: diminished throughout with coarse air exchange otherwise clear to auscultation bilaterally,no rales or wheezes   Heart: regular rate and rhythm, S1, S2 normal, no murmur  Abdomen: soft, non-tender; non-distended, normal bowel sounds no masses, no organomegaly  Extremities: No lower extremity edema, erythema or TTP. Right thumb with erythema, edema and warmth with TTP and fluctuance noted   Skin: Skin color, texture, turgor normal. No rashes or lesions  Lymphatic: No palpable lymph node enlargment  Neurologic: Alert and oriented X 3, normal strength and tone. Normal symmetric reflexes.  Mental status: Alert, oriented, thought content appropriate  Cranial nerves:II-XII Grossly intact Sensory: normal Motor:grossly normal  Psychiatric: Alert and oriented, thought content appropriate, normal insight, mood appropriate    Diagnostic Data:  CBC:  Recent Labs     01/11/21 1950   WBC 9.6   HGB 15.4 HCT 49.4        BMP:  Recent Labs     01/11/21 1950      K 4.3      CO2 24   BUN 13   CREATININE 0.9   CALCIUM 9.2     Recent Labs     01/11/21 1950   AST 39   ALT 95*   BILITOT <0.2   ALKPHOS 92     Assessment/Plan:  Principal Problem:    Osteomyelitis of finger (HCC)-continue Vancomycin/Zosyn with gentle IVF's and monitor renal parameters closely. Orthopedic surgery consulted. Blood cultures ordered. Add oral Norco with hold parameters for pain.  NPO after midnight pending Ortho recommendations     Active Problems:    Elevated systolic blood pressure reading without diagnosis of hypertension-monitor, consider addition of antihypertensive if remains elevated once pain controlled      Neurofibromatosis (HCC)-noted, has had removed from E previously      Tobacco abuse-add nicotine patch, counseled at length on cessation    Further orders per clinical course/attending      Signed:  Yennifer Schofield PA-C

## 2021-01-12 NOTE — CONSULTS
Fear of current or ex partner: Not on file     Emotionally abused: Not on file     Physically abused: Not on file     Forced sexual activity: Not on file   Other Topics Concern    Not on file   Social History Narrative    Not on file       Family History:   Family History   Problem Relation Age of Onset    Hypertension Mother     Neurofibromatosis Mother     No Known Problems Father        REVIEW OF SYSTEMS:  14 point review of systems has been reviewed from the patient's emergency room visit, reviewed with the patient on today's date with no new changes. PHYSICAL EXAM:      Physical Examination:  Vitals:   Vitals:    01/11/21 1743 01/11/21 2139 01/12/21 0012 01/12/21 0628   BP: (!) 140/100 (!) 154/93 127/79 129/76   Pulse: 93 73 70 76   Resp: 21 16 14 16   Temp: 97.6 °F (36.4 °C) 96.6 °F (35.9 °C) 97.3 °F (36.3 °C) 96.2 °F (35.7 °C)   TempSrc: Oral Temporal Temporal Temporal   SpO2: 97% 96% 95% 94%   Weight: 208 lb (94.3 kg) 202 lb 3.2 oz (91.7 kg)     Height: 5' 4\" (1.626 m)        General:  Appears stated age, no distress. Orientation:  Alert and oriented to time, place, and person. Mood and Affect:  Cooperative and pleasant. Gait:  Resting comfortably in bed. Cardiovascular:  Symmetric 1-2 plus pulses in upper and lower extremities. Lymph:  No cervical or inguinal lymphadenopathy noted. Sensation:  Grossly intact to light touch. DTR:  Normal, no pathologic reflexes. Coordination/balance:  Normal    Musculoskeletal:  Right upper extremity exam:  Skin is intact. Swollen and red at dorsal side of IP joint and tip of distal phalanx. Tender to palp of same  Active ROM is intact but diminished. NVI distally. Left upper extremity exam:  There is no tenderness to palpation about the shoulder, elbow, wrist or hand. Unrestricted full function motion is present.   Stability is normal with provocative tests, 5/5 strength, and skin is normal.     Right lower extremity exam:  There is no tenderness to palpation about the hip, knee, ankle or foot. Unrestricted full function motion is present. Stability is normal with provocative tests, 5/5 strength, and skin is normal.     Left lower extremity exam:  There is no tenderness to palpation about the hip, knee, ankle or foot. Unrestricted full function motion is present. Stability is normal with provocative tests, 5/5 strength, and skin is normal.      DATA:    CBC with Differential:    Lab Results   Component Value Date    WBC 9.3 01/12/2021    RBC 4.67 01/12/2021    HGB 14.3 01/12/2021    HCT 44.9 01/12/2021     01/12/2021    MCV 96.1 01/12/2021    MCH 30.6 01/12/2021    MCHC 31.8 01/12/2021    RDW 12.7 01/12/2021    LYMPHOPCT 30.4 01/12/2021    MONOPCT 10.7 01/12/2021    BASOPCT 0.9 01/12/2021    MONOSABS 1.00 01/12/2021    LYMPHSABS 2.8 01/12/2021    EOSABS 0.20 01/12/2021    BASOSABS 0.10 01/12/2021     CMP:    Lab Results   Component Value Date     01/12/2021    K 4.6 01/12/2021     01/12/2021    CO2 25 01/12/2021    BUN 15 01/12/2021    CREATININE 1.0 01/12/2021    GFRAA >59 01/12/2021    LABGLOM >60 01/12/2021    GLUCOSE 81 01/12/2021    PROT 7.5 01/11/2021    CALCIUM 8.7 01/12/2021    BILITOT <0.2 01/11/2021    ALKPHOS 92 01/11/2021    AST 39 01/11/2021    ALT 95 01/11/2021     BMP:    Lab Results   Component Value Date     01/12/2021    K 4.6 01/12/2021     01/12/2021    CO2 25 01/12/2021    BUN 15 01/12/2021    CREATININE 1.0 01/12/2021    CALCIUM 8.7 01/12/2021    GFRAA >59 01/12/2021    LABGLOM >60 01/12/2021    GLUCOSE 81 01/12/2021         Radiology: I have reviewed the radiology images listed below and agree with the findings of the interpreting radiologist(s). No results found. Will repeat thumb films today  --------------------------------------------------------------------------------------------------------------------    Assessment: Cellulitis of right thumb, initial encounter.     Plan:    Continue non-op

## 2021-01-13 LAB
ANION GAP SERPL CALCULATED.3IONS-SCNC: 9 MMOL/L (ref 7–19)
BASOPHILS ABSOLUTE: 0.1 K/UL (ref 0–0.2)
BASOPHILS RELATIVE PERCENT: 0.7 % (ref 0–1)
BUN BLDV-MCNC: 13 MG/DL (ref 6–20)
C-REACTIVE PROTEIN: 0.64 MG/DL (ref 0–0.5)
CALCIUM SERPL-MCNC: 8.3 MG/DL (ref 8.6–10)
CHLORIDE BLD-SCNC: 104 MMOL/L (ref 98–111)
CO2: 23 MMOL/L (ref 22–29)
CREAT SERPL-MCNC: 1 MG/DL (ref 0.5–1.2)
EOSINOPHILS ABSOLUTE: 0.2 K/UL (ref 0–0.6)
EOSINOPHILS RELATIVE PERCENT: 2.5 % (ref 0–5)
GFR AFRICAN AMERICAN: >59
GFR NON-AFRICAN AMERICAN: >60
GLUCOSE BLD-MCNC: 118 MG/DL (ref 74–109)
HCT VFR BLD CALC: 44.9 % (ref 42–52)
HEMOGLOBIN: 14.5 G/DL (ref 14–18)
IMMATURE GRANULOCYTES #: 0.1 K/UL
LYMPHOCYTES ABSOLUTE: 1.4 K/UL (ref 1.1–4.5)
LYMPHOCYTES RELATIVE PERCENT: 16.4 % (ref 20–40)
MCH RBC QN AUTO: 30.9 PG (ref 27–31)
MCHC RBC AUTO-ENTMCNC: 32.3 G/DL (ref 33–37)
MCV RBC AUTO: 95.5 FL (ref 80–94)
MONOCYTES ABSOLUTE: 0.7 K/UL (ref 0–0.9)
MONOCYTES RELATIVE PERCENT: 8.1 % (ref 0–10)
NEUTROPHILS ABSOLUTE: 6.1 K/UL (ref 1.5–7.5)
NEUTROPHILS RELATIVE PERCENT: 71.6 % (ref 50–65)
PDW BLD-RTO: 12.4 % (ref 11.5–14.5)
PLATELET # BLD: 178 K/UL (ref 130–400)
PLATELET SLIDE REVIEW: ADEQUATE
PMV BLD AUTO: 11.5 FL (ref 9.4–12.4)
POTASSIUM REFLEX MAGNESIUM: 4.4 MMOL/L (ref 3.5–5)
PROCALCITONIN: 0.05 NG/ML (ref 0–0.09)
RBC # BLD: 4.7 M/UL (ref 4.7–6.1)
RHEUMATOID FACTOR: 22 IU/ML
SEDIMENTATION RATE, ERYTHROCYTE: 8 MM/HR (ref 0–10)
SODIUM BLD-SCNC: 136 MMOL/L (ref 136–145)
WBC # BLD: 8.6 K/UL (ref 4.8–10.8)

## 2021-01-13 PROCEDURE — 85025 COMPLETE CBC W/AUTO DIFF WBC: CPT

## 2021-01-13 PROCEDURE — 36415 COLL VENOUS BLD VENIPUNCTURE: CPT

## 2021-01-13 PROCEDURE — 6360000002 HC RX W HCPCS: Performed by: PHYSICIAN ASSISTANT

## 2021-01-13 PROCEDURE — 2580000003 HC RX 258: Performed by: PHYSICIAN ASSISTANT

## 2021-01-13 PROCEDURE — 85652 RBC SED RATE AUTOMATED: CPT

## 2021-01-13 PROCEDURE — 1210000000 HC MED SURG R&B

## 2021-01-13 PROCEDURE — 86038 ANTINUCLEAR ANTIBODIES: CPT

## 2021-01-13 PROCEDURE — 80048 BASIC METABOLIC PNL TOTAL CA: CPT

## 2021-01-13 PROCEDURE — 86431 RHEUMATOID FACTOR QUANT: CPT

## 2021-01-13 PROCEDURE — 86140 C-REACTIVE PROTEIN: CPT

## 2021-01-13 PROCEDURE — 6370000000 HC RX 637 (ALT 250 FOR IP): Performed by: INTERNAL MEDICINE

## 2021-01-13 PROCEDURE — 84145 PROCALCITONIN (PCT): CPT

## 2021-01-13 PROCEDURE — 6370000000 HC RX 637 (ALT 250 FOR IP): Performed by: PHYSICIAN ASSISTANT

## 2021-01-13 RX ORDER — SIMETHICONE 80 MG
160 TABLET,CHEWABLE ORAL 2 TIMES DAILY
Status: DISCONTINUED | OUTPATIENT
Start: 2021-01-13 | End: 2021-01-13

## 2021-01-13 RX ORDER — SIMETHICONE 80 MG
160 TABLET,CHEWABLE ORAL 2 TIMES DAILY PRN
Status: DISCONTINUED | OUTPATIENT
Start: 2021-01-13 | End: 2021-01-15 | Stop reason: HOSPADM

## 2021-01-13 RX ORDER — PANTOPRAZOLE SODIUM 40 MG/1
40 TABLET, DELAYED RELEASE ORAL
Status: DISCONTINUED | OUTPATIENT
Start: 2021-01-13 | End: 2021-01-15 | Stop reason: HOSPADM

## 2021-01-13 RX ADMIN — PIPERACILLIN SODIUM AND TAZOBACTAM SODIUM 3.38 G: 3; .375 INJECTION, POWDER, LYOPHILIZED, FOR SOLUTION INTRAVENOUS at 10:26

## 2021-01-13 RX ADMIN — PIPERACILLIN SODIUM AND TAZOBACTAM SODIUM 3.38 G: 3; .375 INJECTION, POWDER, LYOPHILIZED, FOR SOLUTION INTRAVENOUS at 01:36

## 2021-01-13 RX ADMIN — SODIUM CHLORIDE: 9 INJECTION, SOLUTION INTRAVENOUS at 10:26

## 2021-01-13 RX ADMIN — PIPERACILLIN SODIUM AND TAZOBACTAM SODIUM 3.38 G: 3; .375 INJECTION, POWDER, LYOPHILIZED, FOR SOLUTION INTRAVENOUS at 14:32

## 2021-01-13 RX ADMIN — VANCOMYCIN HYDROCHLORIDE 1500 MG: 10 INJECTION, POWDER, LYOPHILIZED, FOR SOLUTION INTRAVENOUS at 12:25

## 2021-01-13 RX ADMIN — PANTOPRAZOLE SODIUM 40 MG: 40 TABLET, DELAYED RELEASE ORAL at 14:32

## 2021-01-13 RX ADMIN — SIMETHICONE 160 MG: 80 TABLET, CHEWABLE ORAL at 01:36

## 2021-01-13 RX ADMIN — PIPERACILLIN SODIUM AND TAZOBACTAM SODIUM 3.38 G: 3; .375 INJECTION, POWDER, LYOPHILIZED, FOR SOLUTION INTRAVENOUS at 21:51

## 2021-01-13 RX ADMIN — Medication 10 ML: at 10:26

## 2021-01-13 RX ADMIN — HYDROCODONE BITARTRATE AND ACETAMINOPHEN 2 TABLET: 5; 325 TABLET ORAL at 08:50

## 2021-01-13 RX ADMIN — HYDROCODONE BITARTRATE AND ACETAMINOPHEN 2 TABLET: 5; 325 TABLET ORAL at 18:46

## 2021-01-13 ASSESSMENT — PAIN DESCRIPTION - PAIN TYPE
TYPE: ACUTE PAIN
TYPE: ACUTE PAIN

## 2021-01-13 ASSESSMENT — PAIN DESCRIPTION - LOCATION: LOCATION: FINGER (COMMENT WHICH ONE)

## 2021-01-13 ASSESSMENT — PAIN DESCRIPTION - FREQUENCY
FREQUENCY: CONTINUOUS
FREQUENCY: CONTINUOUS

## 2021-01-13 ASSESSMENT — PAIN DESCRIPTION - PROGRESSION: CLINICAL_PROGRESSION: GRADUALLY WORSENING

## 2021-01-13 ASSESSMENT — PAIN SCALES - GENERAL: PAINLEVEL_OUTOF10: 9

## 2021-01-13 ASSESSMENT — PAIN DESCRIPTION - DESCRIPTORS: DESCRIPTORS: THROBBING

## 2021-01-13 ASSESSMENT — PAIN - FUNCTIONAL ASSESSMENT: PAIN_FUNCTIONAL_ASSESSMENT: PREVENTS OR INTERFERES SOME ACTIVE ACTIVITIES AND ADLS

## 2021-01-13 NOTE — PROGRESS NOTES
[id number] [facility]    Patient:  Iraida Sun  YOB: 1980  Date of Service: 1/13/2021  MRN: [id number]   Acct: [de-identified]   Primary Care Physician: No primary care provider on file. Advance Directive: Full Code  Admit Date: 1/11/2021       Hospital Day: 2  Portions of this note have been copied forward, however, changed to reflect the most current clinical status of this patient. CHIEF COMPLAINT Right thumb pain/swelling    SUBJECTIVE: Mr. Riana Fu continues to complain of right thumb pain today. Was nauseated overnight. Denies vomiting or diarrhea. Cumulative Hospital Course: The patient is a 45 y.o. male with PMH neurofibromatosis, GI bleed who presented to [facility] ED complaining of right thumb pain, erythema, edema that started about 1 week ago right [place] got [place] the boat. He denies fever, body aches, chills. States pain has been worsening and is exacerbating when he attempts to bend thumb and states it feels stiff 2/2 edema. He cannot recall how he injured his thumb. He is right hand dominant. Mr. Riana Fu had a right hand radiograph today that is concerning for Osteomyelitis. He was admitted to Hospitalist service with consult placed to Orthopedic surgery. IV Vancomycin/Zosyn continued. Review of Systems:   14 point review of systems is negative except as specifically addressed above. Objective:   VITALS:  /71   Pulse 77   Temp 96.8 °F (36 °C) (Temporal)   Resp 16   Ht 5' 4\" (1.626 m)   Wt 202 lb 3.2 oz (91.7 kg)   SpO2 97%   BMI 34.71 kg/m²   24HR INTAKE/OUTPUT:      Intake/Output Summary (Last 24 hours) at 1/13/2021 1344  Last data filed at 1/13/2021 0800  Gross per 24 hour   Intake 600 ml   Output --   Net 600 ml     General appearance: alert, appears stated age, cooperative and no distress, resting comfortably in bed   Head: Normocephalic, without obvious abnormality, atraumatic  Eyes: conjunctivae/corneas clear. PERRL, EOM's intact.    Ears: normal external ears and nose, throat without exudate  Neck: no adenopathy, no carotid bruit, no JVD, supple, symmetrical, trachea midline   Lungs: coarse air entry throughout no wheezing, rales or rhonchi  Heart: RRR, S1, S2 normal, no murmur  Abdomen:soft, non-tender; non-distended, normal bowel sounds no masses, no organomegaly  Extremities:No lower extremity edema, erythema or tenderness to palpation. Right thumb with edema, erythema -slightly worsened todayand TTP at IP joint  Skin: Skin color, texture, turgor normal. No rashes or lesions  Lymphatic: No palpable lymph node enlargment  Neurologic: Alert and oriented X 3, normal strength and tone. No focal deficits  Psychiatric: Alert and oriented, thought content appropriate, normal insight, mood appropriate    Medications:      sodium chloride 75 mL/hr at 01/13/21 1026      sodium chloride flush  10 mL Intravenous 2 times per day    enoxaparin  40 mg Subcutaneous Daily    piperacillin-tazobactam  3.375 g Intravenous Q6H    nicotine  1 patch Transdermal Daily    vancomycin  1,500 mg Intravenous Q12H    vancomycin (VANCOCIN) intermittent dosing (placeholder)   Other RX Placeholder     simethicone, sodium chloride flush, promethazine **OR** ondansetron, polyethylene glycol, acetaminophen **OR** acetaminophen, potassium chloride **OR** potassium alternative oral replacement **OR** potassium chloride, magnesium sulfate, HYDROcodone 5 mg - acetaminophen **OR** HYDROcodone 5 mg - acetaminophen  DIET GENERAL; Low Sodium (2 GM)     Lab and other Data:     Recent Labs     01/11/21 1950 01/12/21  0155 01/13/21  0231   WBC 9.6 9.3 8.6   HGB 15.4 14.3 14.5    253 178     Recent Labs     01/11/21 1950 01/12/21  0155 01/13/21  0231    141 136   K 4.3 4.6 4.4    106 104   CO2 24 25 23   BUN 13 15 13   CREATININE 0.9 1.0 1.0   GLUCOSE 94 81 118*     Recent Labs     01/11/21 1950   AST 39   ALT 95*   BILITOT <0.2   ALKPHOS 92     RAD:   1.  Abnormal right thumb distal phalanx with absence of the tip of the  thumb and a diffusely mottled and irregular appearance of the distal  phalanx. Questionable cortical fracture noted. Micro: Blood cultures negative to date     Assessment/Plan   Principal Problem:    Cellulitis right thumb concerning for Osteomyelitis of finger (HCC)-continue empiric IV antibiotics, plain film noted, Orthopedic surgery following-?  Need for surgical intervention     Active Problems:    Elevated systolic blood pressure reading without diagnosis of hypertension-BP improved with pain control, stable      Neurofibromatosis (HCC)-noted      Tobacco abuse-Nicotine patch ordered      Nausea-resolved with Zofran, add PPI    Further orders per clinical course/attending    Antibiotic: Vancomycin, Zosyn    DVT Prophylaxis: Lovenox    Pooja Drummond PA-C

## 2021-01-14 LAB
ANION GAP SERPL CALCULATED.3IONS-SCNC: 6 MMOL/L (ref 7–19)
BASOPHILS ABSOLUTE: 0.1 K/UL (ref 0–0.2)
BASOPHILS RELATIVE PERCENT: 0.7 % (ref 0–1)
BUN BLDV-MCNC: 13 MG/DL (ref 6–20)
CALCIUM SERPL-MCNC: 8.7 MG/DL (ref 8.6–10)
CHLORIDE BLD-SCNC: 105 MMOL/L (ref 98–111)
CO2: 25 MMOL/L (ref 22–29)
CREAT SERPL-MCNC: 0.8 MG/DL (ref 0.5–1.2)
EOSINOPHILS ABSOLUTE: 0.3 K/UL (ref 0–0.6)
EOSINOPHILS RELATIVE PERCENT: 3.1 % (ref 0–5)
GFR AFRICAN AMERICAN: >59
GFR NON-AFRICAN AMERICAN: >60
GLUCOSE BLD-MCNC: 108 MG/DL (ref 74–109)
HCT VFR BLD CALC: 45.3 % (ref 42–52)
HEMOGLOBIN: 14.7 G/DL (ref 14–18)
IMMATURE GRANULOCYTES #: 0.1 K/UL
LYMPHOCYTES ABSOLUTE: 2.1 K/UL (ref 1.1–4.5)
LYMPHOCYTES RELATIVE PERCENT: 25.2 % (ref 20–40)
MCH RBC QN AUTO: 31.1 PG (ref 27–31)
MCHC RBC AUTO-ENTMCNC: 32.5 G/DL (ref 33–37)
MCV RBC AUTO: 96 FL (ref 80–94)
MONOCYTES ABSOLUTE: 0.7 K/UL (ref 0–0.9)
MONOCYTES RELATIVE PERCENT: 8.2 % (ref 0–10)
NEUTROPHILS ABSOLUTE: 5.1 K/UL (ref 1.5–7.5)
NEUTROPHILS RELATIVE PERCENT: 62.2 % (ref 50–65)
PDW BLD-RTO: 12.4 % (ref 11.5–14.5)
PLATELET # BLD: 256 K/UL (ref 130–400)
PMV BLD AUTO: 10.4 FL (ref 9.4–12.4)
POTASSIUM REFLEX MAGNESIUM: 4.1 MMOL/L (ref 3.5–5)
RBC # BLD: 4.72 M/UL (ref 4.7–6.1)
SODIUM BLD-SCNC: 136 MMOL/L (ref 136–145)
URIC ACID, SERUM: 2.2 MG/DL (ref 3.4–7)
VANCOMYCIN TROUGH: 11.2 UG/ML (ref 10–20)
WBC # BLD: 8.2 K/UL (ref 4.8–10.8)

## 2021-01-14 PROCEDURE — 85025 COMPLETE CBC W/AUTO DIFF WBC: CPT

## 2021-01-14 PROCEDURE — 6370000000 HC RX 637 (ALT 250 FOR IP): Performed by: PHYSICIAN ASSISTANT

## 2021-01-14 PROCEDURE — 84550 ASSAY OF BLOOD/URIC ACID: CPT

## 2021-01-14 PROCEDURE — 6360000002 HC RX W HCPCS: Performed by: PHYSICIAN ASSISTANT

## 2021-01-14 PROCEDURE — 2580000003 HC RX 258: Performed by: PHYSICIAN ASSISTANT

## 2021-01-14 PROCEDURE — 80048 BASIC METABOLIC PNL TOTAL CA: CPT

## 2021-01-14 PROCEDURE — 1210000000 HC MED SURG R&B

## 2021-01-14 PROCEDURE — 36415 COLL VENOUS BLD VENIPUNCTURE: CPT

## 2021-01-14 PROCEDURE — 80202 ASSAY OF VANCOMYCIN: CPT

## 2021-01-14 PROCEDURE — 6370000000 HC RX 637 (ALT 250 FOR IP): Performed by: INTERNAL MEDICINE

## 2021-01-14 RX ORDER — CLINDAMYCIN HYDROCHLORIDE 300 MG/1
300 CAPSULE ORAL 4 TIMES DAILY
Qty: 28 CAPSULE | Refills: 0 | Status: SHIPPED | OUTPATIENT
Start: 2021-01-14 | End: 2021-01-21

## 2021-01-14 RX ORDER — PANTOPRAZOLE SODIUM 40 MG/1
40 TABLET, DELAYED RELEASE ORAL
Qty: 30 TABLET | Refills: 0 | Status: SHIPPED | OUTPATIENT
Start: 2021-01-15

## 2021-01-14 RX ORDER — SACCHAROMYCES BOULARDII 250 MG
250 CAPSULE ORAL 2 TIMES DAILY
Qty: 14 CAPSULE | Refills: 0 | Status: SHIPPED | OUTPATIENT
Start: 2021-01-14 | End: 2021-01-21

## 2021-01-14 RX ORDER — LACTOBACILLUS RHAMNOSUS GG 10B CELL
1 CAPSULE ORAL DAILY
Status: DISCONTINUED | OUTPATIENT
Start: 2021-01-14 | End: 2021-01-15 | Stop reason: HOSPADM

## 2021-01-14 RX ADMIN — VANCOMYCIN HYDROCHLORIDE 1500 MG: 10 INJECTION, POWDER, LYOPHILIZED, FOR SOLUTION INTRAVENOUS at 01:38

## 2021-01-14 RX ADMIN — PIPERACILLIN SODIUM AND TAZOBACTAM SODIUM 3.38 G: 3; .375 INJECTION, POWDER, LYOPHILIZED, FOR SOLUTION INTRAVENOUS at 08:46

## 2021-01-14 RX ADMIN — PIPERACILLIN SODIUM AND TAZOBACTAM SODIUM 3.38 G: 3; .375 INJECTION, POWDER, LYOPHILIZED, FOR SOLUTION INTRAVENOUS at 04:30

## 2021-01-14 RX ADMIN — SIMETHICONE 160 MG: 80 TABLET, CHEWABLE ORAL at 12:44

## 2021-01-14 RX ADMIN — HYDROCODONE BITARTRATE AND ACETAMINOPHEN 2 TABLET: 5; 325 TABLET ORAL at 08:46

## 2021-01-14 RX ADMIN — HYDROCODONE BITARTRATE AND ACETAMINOPHEN 2 TABLET: 5; 325 TABLET ORAL at 01:38

## 2021-01-14 RX ADMIN — PIPERACILLIN SODIUM AND TAZOBACTAM SODIUM 3.38 G: 3; .375 INJECTION, POWDER, LYOPHILIZED, FOR SOLUTION INTRAVENOUS at 20:04

## 2021-01-14 RX ADMIN — Medication 1 CAPSULE: at 20:04

## 2021-01-14 RX ADMIN — VANCOMYCIN HYDROCHLORIDE 1500 MG: 10 INJECTION, POWDER, LYOPHILIZED, FOR SOLUTION INTRAVENOUS at 12:40

## 2021-01-14 RX ADMIN — PIPERACILLIN SODIUM AND TAZOBACTAM SODIUM 3.38 G: 3; .375 INJECTION, POWDER, LYOPHILIZED, FOR SOLUTION INTRAVENOUS at 15:04

## 2021-01-14 RX ADMIN — HYDROCODONE BITARTRATE AND ACETAMINOPHEN 2 TABLET: 5; 325 TABLET ORAL at 17:13

## 2021-01-14 ASSESSMENT — PAIN SCALES - GENERAL
PAINLEVEL_OUTOF10: 8
PAINLEVEL_OUTOF10: 8

## 2021-01-14 ASSESSMENT — PAIN DESCRIPTION - PAIN TYPE: TYPE: ACUTE PAIN

## 2021-01-14 ASSESSMENT — PAIN DESCRIPTION - LOCATION: LOCATION: FINGER (COMMENT WHICH ONE)

## 2021-01-14 ASSESSMENT — PAIN DESCRIPTION - DESCRIPTORS
DESCRIPTORS: THROBBING
DESCRIPTORS: THROBBING

## 2021-01-14 ASSESSMENT — PAIN - FUNCTIONAL ASSESSMENT
PAIN_FUNCTIONAL_ASSESSMENT: PREVENTS OR INTERFERES SOME ACTIVE ACTIVITIES AND ADLS
PAIN_FUNCTIONAL_ASSESSMENT: PREVENTS OR INTERFERES SOME ACTIVE ACTIVITIES AND ADLS

## 2021-01-14 ASSESSMENT — PAIN DESCRIPTION - ORIENTATION
ORIENTATION: RIGHT
ORIENTATION: RIGHT

## 2021-01-14 ASSESSMENT — PAIN DESCRIPTION - FREQUENCY: FREQUENCY: CONTINUOUS

## 2021-01-14 ASSESSMENT — PAIN DESCRIPTION - ONSET: ONSET: ON-GOING

## 2021-01-14 NOTE — PROGRESS NOTES
Orthopedic Surgery Progress Note    Gretchen Leyden  1/13/2021      Subjective:     Systemic or Specific Complaints:  Pain and swelling about same as yesterday. Now says he has   Had pain in that thumb for months. Objective:     Patient Vitals for the past 24 hrs:   BP Temp Temp src Pulse Resp SpO2   01/13/21 0618 122/71 96.8 °F (36 °C) Temporal 77 16 97 %   01/13/21 0007 129/81 97.1 °F (36.2 °C) Temporal 71 18 94 %       right upper  General: alert, appears stated age and cooperative   Wound: None. Dressing: clean, dry, and intact   Extremity: Distal NVI. Thumb IP joint still very swollen and tender dorsally. DVT Exam: No evidence of DVT seen on physical exam.                   Data Review:  Recent Labs     01/12/21  0155 01/13/21  0231   HGB 14.3 14.5     Recent Labs     01/13/21  0231      K 4.4   CREATININE 1.0     New three view hand films reveal likely osteomyelitis of distal phalanx. Assessment:     Cellulitis,osteomyelitis, right thumb    Plan:      I have discussed this case with not only Dr. Veronica Pastor but also Dr. Almaz Malloy personally. Patient may very well require amputation of the digit at the IP joint. I have discussed this with . Leon Marinelli today. That being said, he will require months of follow-up including medication management, wound management, dressing  Changes, possible physical therapy, etc.  Surgery is not an emergency in this case. It would be preferable for him to return home to [place] and have surgery performed by orthopedist who will then follow him regularly for continuity of care. Dr. Almaz Malloy agrees with the plan and will begin search for ortho [place], [place] . My medical assistant will be  Researching this as well.         Electronically signed by Harlan Carroll PA-C on 1/13/2021 at 6:09 PM

## 2021-01-14 NOTE — CARE COORDINATION
Medical records release form signed by pt and medical records faxed to [facility], in order to facilitate transition of continued care to patients home [facility]. Melissa  # [phone]. pts / thru [employer].   Electronically signed by Slava Woo RN on 1/14/2021 at 2:38 PM

## 2021-01-14 NOTE — DISCHARGE SUMMARY
Virginie Gonzales  :  1980  MRN:  [id number]    Admit date:  2021  Discharge date:  01/15/2021    Discharging Physician:  Ailyn Kyle MD    Advance Directive: Full Code    Consults: Dr. Sana Andino, [id number] surgery    Discharge Diagnoses:    Principal Problem:    Osteomyelitis of finger ([facility] [address]  Active Problems:    Elevated systolic blood pressure reading without diagnosis of hypertension    Neurofibromatosis ([facility] [address]    Tobacco abuse  Resolved Problems:    * No resolved hospital problems. *    Portions of this note have been copied forward, however, changed to reflect the most current clinical status of this patient. Hospital Course: The patient is a 46 y. o. male with PMH neurofibromatosis, GI bleed who presented to [facility] ED complaining of right thumb pain, erythema, edema that he stated inititally started about 1 week ago right [place] got [place] the boat. He denies fever, body aches, chills. States pain has been worsening and is exacerbated when he attempts to bend thumb and states it feels stiff 2/2 edema. He cannot recall how he injured his thumb and does not remember when it began hurting but did state it has been going on for \"a while\". He is right hand dominant. Mr. Dania Cotter had a right hand radiograph that was concerning for Osteomyelitis. He was admitted to Hospitalist service with consult placed to Orthopedic surgery. IV Vancomycin/Zosyn continued. Mr. Dania Cotter lives in [place] and has requested to return home for any further intervention. This was discussed with Orthopedic surgery who state he will need to be followed regularly for continuity of care and agree that patient may be. Rheumatoid factor elevated. Patient also recommended to follow up with Rheumatology. At this time he will be discharged in stable condition for immediate follow up with [facility] who will be assisting with follow up care. Significant Diagnostic Studies:   Xr Hand Right (min 3 Views)  1.  Abnormal right thumb distal phalanx with absence of the tip of the thumb and a diffusely mottled and irregular appearance of the distal phalanx. Questionable cortical fracture noted. Signed by Dr Jaime Michel on 1/12/2021 12:46 PM    Micro: Blood cultures negative to date     Pertinent Labs:   CBC:   Recent Labs     01/12/21  0155 01/13/21 0231 01/14/21 0157   WBC 9.3 8.6 8.2   HGB 14.3 14.5 14.7    178 256     BMP:    Recent Labs     01/12/21  0155 01/13/21  0231 01/14/21 0157    136 136   K 4.6 4.4 4.1    104 105   CO2 25 23 25   BUN 15 13 13   CREATININE 1.0 1.0 0.8   GLUCOSE 81 118* 108     Physical Exam:  Vital Signs: /81   Pulse 77   Temp 97.5 °F (36.4 °C) (Temporal)   Resp 15   Ht 5' 4\" (1.626 m)   Wt 202 lb 3.2 oz (91.7 kg)   SpO2 94%   BMI 34.71 kg/m²   General appearance: Alert and Cooperative   HEENT: Normocephalic. Chest: clear to auscultation bilaterally without wheezes or rhonchi. Cardiac: Normal heart tones with regular rate and rhythm, S1, S2 normal. No murmurs, gallops, or rubs auscultated. Abdomen:soft, non-tender; non-distended normal bowel sounds no masses, no organomegaly. Extremities: No clubbing or cyanosis. No peripheral edema. Peripheral pulses palpable. Right thumb with edema, erythema, TTP   Neurologic: Grossly intact. Discharge Medications: Remigio Marcelino   Home Medication Instructions ENR:[id number]    Printed on:01/14/21 1239   Medication Information                      clindamycin (CLEOCIN) 300 MG capsule  Take 1 capsule by mouth 4 times daily for 7 days             pantoprazole (PROTONIX) 40 MG tablet  Take 1 tablet by mouth every morning (before breakfast)             saccharomyces boulardii (FLORASTOR) 250 MG capsule  Take 1 capsule by mouth 2 times daily for 7 days               Discharge Instructions: Follow up with PCP of choice in 3-5 days.   Follow up with Rheumatology as well as Orthopedic surgery to discuss surgical intervention/medical treatment of thumb. Take medications as directed. Resume activity as tolerated. Diet: DIET GENERAL; Low Sodium (2 GM)     Disposition: Patient is medically stable and will be discharged home. Time spent on discharge 40 minutes spent in assessing patient, reviewing medications, discussion with nursing, confirming safe discharge plan and preparation of discharge summary.     Signed:  Jacque Macdonald PA-C

## 2021-01-14 NOTE — PROGRESS NOTES
throat without exudate  Neck: no adenopathy, no carotid bruit, no JVD, supple, symmetrical, trachea midline   Lungs: clear throughout, no wheezing, rales or rhonchi  Heart: regular rate and rhythm, S1, S2 normal, no murmur  Abdomen:soft, non-tender; non-distended, normal bowel sounds no masses, no organomegaly  Extremities:No lower extremity edema, erythema or tenderness to palpation. Right thumb with edema, erythema -unchanged   Skin: Skin color, texture, turgor normal. No rashes or lesions  Lymphatic: No palpable lymph node enlargment  Neurologic: Alert and oriented X 3, normal strength and tone. No focal deficits  Psychiatric: Alert and oriented, thought content appropriate, normal insight, mood appropriate    Medications:      sodium chloride 75 mL/hr at 01/13/21 1026      pantoprazole  40 mg Oral QAM AC    sodium chloride flush  10 mL Intravenous 2 times per day    enoxaparin  40 mg Subcutaneous Daily    piperacillin-tazobactam  3.375 g Intravenous Q6H    nicotine  1 patch Transdermal Daily    vancomycin  1,500 mg Intravenous Q12H    vancomycin (VANCOCIN) intermittent dosing (placeholder)   Other RX Placeholder     simethicone, sodium chloride flush, promethazine **OR** ondansetron, polyethylene glycol, acetaminophen **OR** acetaminophen, potassium chloride **OR** potassium alternative oral replacement **OR** potassium chloride, magnesium sulfate, HYDROcodone 5 mg - acetaminophen **OR** HYDROcodone 5 mg - acetaminophen  DIET GENERAL; Low Sodium (2 GM)     Lab and other Data:     Recent Labs     01/12/21  0155 01/13/21  0231 01/14/21  0157   WBC 9.3 8.6 8.2   HGB 14.3 14.5 14.7    178 256     Recent Labs     01/12/21  0155 01/13/21  0231 01/14/21  0157    136 136   K 4.6 4.4 4.1    104 105   CO2 25 23 25   BUN 15 13 13   CREATININE 1.0 1.0 0.8   GLUCOSE 81 118* 108     Recent Labs     01/11/21  1950   AST 39   ALT 95*   BILITOT <0.2   ALKPHOS 92     RAD:   1.  Abnormal right thumb distal phalanx with absence of the tip of the  thumb and a diffusely mottled and irregular appearance of the distal  phalanx. Questionable cortical fracture noted. Micro: Blood cultures negative to date     Assessment/Plan   Principal Problem:    Cellulitis right thumb concerning for Osteomyelitis of finger (HCC)-continue empiric IV antibiotics, plain film noted, Orthopedic surgery following, stable    Active Problems:    Elevated systolic blood pressure reading without diagnosis of hypertension-BP improved with pain control, stable      Neurofibromatosis (HCC)-noted      Tobacco abuse-Nicotine patch ordered      Nausea-resolved    Patient can be discharged home once outpatient appointments confirmed with Rheumatology and Orthopedic surgery.      Antibiotic: Vancomycin, Zosyn    DVT Prophylaxis: Jani Barr PA-C

## 2021-01-14 NOTE — PROGRESS NOTES
Pharmacy Vancomycin Consult     Vancomycin Day: 3  Current Dosinmg Q12hr    Temp max:  98    Recent Labs     21  0231 21  0157   BUN 13 13       Recent Labs     21  0231 21  0157   CREATININE 1.0 0.8       Recent Labs     21  0231 21  0157   WBC 8.6 8.2         Intake/Output Summary (Last 24 hours) at 2021 0418  Last data filed at 2021 1230  Gross per 24 hour   Intake 360 ml   Output --   Net 360 ml       Culture Date Source Results   21  Blood No growth                 Ht Readings from Last 1 Encounters:   21 5' 4\" (1.626 m)        Wt Readings from Last 1 Encounters:   21 202 lb 3.2 oz (91.7 kg)         Body mass index is 34.71 kg/m². Estimated Creatinine Clearance: 127 mL/min (based on SCr of 0.8 mg/dL).     Trough: 11.2    Assessment/Plan: Level good; continue same    Electronically signed by Anil Mcclelland [facility] on 2021 at 4:18 AM

## 2021-01-14 NOTE — PROGRESS NOTES
Patient gives permission to speak with Pooja Bustamante about arranging outpatient appointments.  Electronically signed by Karo Mills RN on 1/14/2021 at 12:52 PM

## 2021-01-15 VITALS
WEIGHT: 202.2 LBS | BODY MASS INDEX: 34.52 KG/M2 | SYSTOLIC BLOOD PRESSURE: 154 MMHG | DIASTOLIC BLOOD PRESSURE: 84 MMHG | TEMPERATURE: 97.4 F | HEART RATE: 77 BPM | RESPIRATION RATE: 18 BRPM | OXYGEN SATURATION: 95 % | HEIGHT: 64 IN

## 2021-01-15 LAB
ANION GAP SERPL CALCULATED.3IONS-SCNC: 9 MMOL/L (ref 7–19)
BASOPHILS ABSOLUTE: 0.1 K/UL (ref 0–0.2)
BASOPHILS RELATIVE PERCENT: 0.7 % (ref 0–1)
BUN BLDV-MCNC: 12 MG/DL (ref 6–20)
CALCIUM SERPL-MCNC: 8.9 MG/DL (ref 8.6–10)
CHLORIDE BLD-SCNC: 104 MMOL/L (ref 98–111)
CO2: 23 MMOL/L (ref 22–29)
CREAT SERPL-MCNC: 0.9 MG/DL (ref 0.5–1.2)
EOSINOPHILS ABSOLUTE: 0.2 K/UL (ref 0–0.6)
EOSINOPHILS RELATIVE PERCENT: 2.5 % (ref 0–5)
GFR AFRICAN AMERICAN: >59
GFR NON-AFRICAN AMERICAN: >60
GLUCOSE BLD-MCNC: 123 MG/DL (ref 74–109)
HCT VFR BLD CALC: 44.7 % (ref 42–52)
HEMOGLOBIN: 14.5 G/DL (ref 14–18)
IMMATURE GRANULOCYTES #: 0 K/UL
LYMPHOCYTES ABSOLUTE: 1.9 K/UL (ref 1.1–4.5)
LYMPHOCYTES RELATIVE PERCENT: 22.8 % (ref 20–40)
MCH RBC QN AUTO: 30.7 PG (ref 27–31)
MCHC RBC AUTO-ENTMCNC: 32.4 G/DL (ref 33–37)
MCV RBC AUTO: 94.7 FL (ref 80–94)
MONOCYTES ABSOLUTE: 0.7 K/UL (ref 0–0.9)
MONOCYTES RELATIVE PERCENT: 8.3 % (ref 0–10)
NEUTROPHILS ABSOLUTE: 5.4 K/UL (ref 1.5–7.5)
NEUTROPHILS RELATIVE PERCENT: 65.2 % (ref 50–65)
PDW BLD-RTO: 12.3 % (ref 11.5–14.5)
PLATELET # BLD: 257 K/UL (ref 130–400)
PMV BLD AUTO: 10.5 FL (ref 9.4–12.4)
POTASSIUM REFLEX MAGNESIUM: 4.2 MMOL/L (ref 3.5–5)
RBC # BLD: 4.72 M/UL (ref 4.7–6.1)
SODIUM BLD-SCNC: 136 MMOL/L (ref 136–145)
WBC # BLD: 8.3 K/UL (ref 4.8–10.8)

## 2021-01-15 PROCEDURE — 6370000000 HC RX 637 (ALT 250 FOR IP): Performed by: PHYSICIAN ASSISTANT

## 2021-01-15 PROCEDURE — 2580000003 HC RX 258: Performed by: PHYSICIAN ASSISTANT

## 2021-01-15 PROCEDURE — 80048 BASIC METABOLIC PNL TOTAL CA: CPT

## 2021-01-15 PROCEDURE — 85025 COMPLETE CBC W/AUTO DIFF WBC: CPT

## 2021-01-15 PROCEDURE — 6360000002 HC RX W HCPCS: Performed by: PHYSICIAN ASSISTANT

## 2021-01-15 PROCEDURE — 36415 COLL VENOUS BLD VENIPUNCTURE: CPT

## 2021-01-15 RX ADMIN — HYDROCODONE BITARTRATE AND ACETAMINOPHEN 2 TABLET: 5; 325 TABLET ORAL at 00:20

## 2021-01-15 RX ADMIN — VANCOMYCIN HYDROCHLORIDE 1500 MG: 10 INJECTION, POWDER, LYOPHILIZED, FOR SOLUTION INTRAVENOUS at 00:20

## 2021-01-15 RX ADMIN — PIPERACILLIN SODIUM AND TAZOBACTAM SODIUM 3.38 G: 3; .375 INJECTION, POWDER, LYOPHILIZED, FOR SOLUTION INTRAVENOUS at 00:20

## 2021-01-15 RX ADMIN — Medication 1 CAPSULE: at 10:07

## 2021-01-15 RX ADMIN — PANTOPRAZOLE SODIUM 40 MG: 40 TABLET, DELAYED RELEASE ORAL at 06:47

## 2021-01-15 RX ADMIN — PIPERACILLIN SODIUM AND TAZOBACTAM SODIUM 3.38 G: 3; .375 INJECTION, POWDER, LYOPHILIZED, FOR SOLUTION INTRAVENOUS at 10:07

## 2021-01-15 RX ADMIN — HYDROCODONE BITARTRATE AND ACETAMINOPHEN 2 TABLET: 5; 325 TABLET ORAL at 10:07

## 2021-01-15 ASSESSMENT — PAIN SCALES - GENERAL: PAINLEVEL_OUTOF10: 8

## 2021-01-15 NOTE — PROGRESS NOTES
According to the night shift RN Fercho Sidhu, Dr. Sang Bliss had called last night and stated that the patient was okay to d/c and that he just needed to follow up with Arav @ [phone] to get everything taken care of. Called to verify appointments with Jules Walker this morning and she was on her way to the office and will cb to let us know the status of everything. Patient should be okay to discharge once we have verified his appointments according to Dr. Julieta Waddell.      Electronically signed by Nikhil Stern RN on 1/15/2021 at 8:31 AM

## 2021-01-15 NOTE — PROGRESS NOTES
Abdiel Hernadez the  from [facility] called and stated that further testing is needed on their end and so they request patient be discharged and immediately go to [facility] in [place] for the needed tests. [employer] will ensure appointments are arranged for the patient for when he gets back to [place].      Electronically signed by Falguni Hua RN on 1/15/2021 at 10:30 AM

## 2021-01-16 LAB
ANA IGG, ELISA: NORMAL
BLOOD CULTURE, ROUTINE: NORMAL
CULTURE, BLOOD 2: NORMAL

## 2021-02-19 DIAGNOSIS — R94.5 ABNORMAL RESULTS OF LIVER FUNCTION STUDIES: Primary | ICD-10-CM

## 2021-02-24 ENCOUNTER — HOSPITAL ENCOUNTER (OUTPATIENT)
Dept: RADIOLOGY | Facility: HOSPITAL | Age: 41
Discharge: HOME | End: 2021-02-24
Attending: NURSE PRACTITIONER
Payer: COMMERCIAL

## 2021-02-24 DIAGNOSIS — R94.5 ABNORMAL RESULTS OF LIVER FUNCTION STUDIES: ICD-10-CM

## 2021-02-24 PROCEDURE — 76700 US EXAM ABDOM COMPLETE: CPT | Mod: TC

## 2021-02-24 PROCEDURE — 76700 US EXAM ABDOM COMPLETE: CPT | Mod: 26,,, | Performed by: RADIOLOGY

## 2021-02-24 PROCEDURE — 76700 US ABDOMEN COMPLETE: ICD-10-PCS | Mod: 26,,, | Performed by: RADIOLOGY

## 2021-12-17 ENCOUNTER — HOSPITAL ENCOUNTER (EMERGENCY)
Facility: HOSPITAL | Age: 41
Discharge: HOME | End: 2021-12-17
Attending: EMERGENCY MEDICINE
Payer: COMMERCIAL

## 2021-12-17 VITALS
OXYGEN SATURATION: 98 % | SYSTOLIC BLOOD PRESSURE: 145 MMHG | RESPIRATION RATE: 20 BRPM | HEART RATE: 82 BPM | DIASTOLIC BLOOD PRESSURE: 86 MMHG | HEIGHT: 66 IN | WEIGHT: 220 LBS | BODY MASS INDEX: 35.36 KG/M2 | TEMPERATURE: 98 F

## 2021-12-17 DIAGNOSIS — M86.9 OSTEOMYELITIS OF RIGHT WRIST: Primary | ICD-10-CM

## 2021-12-17 DIAGNOSIS — M86.9 OSTEOMYELITIS: ICD-10-CM

## 2021-12-17 LAB
BASOPHILS # BLD AUTO: 0.06 K/UL (ref 0–0.2)
BASOPHILS NFR BLD: 0.8 % (ref 0–1.9)
CK MB SERPL-MCNC: 0.9 NG/ML (ref 0.1–6.5)
CK MB SERPL-RTO: 1 % (ref 0–5)
CK SERPL-CCNC: 91 U/L (ref 20–200)
CRP SERPL-MCNC: 7.4 MG/L (ref 0–8.2)
DIFFERENTIAL METHOD: ABNORMAL
EOSINOPHIL # BLD AUTO: 0.2 K/UL (ref 0–0.5)
EOSINOPHIL NFR BLD: 2.3 % (ref 0–8)
ERYTHROCYTE [DISTWIDTH] IN BLOOD BY AUTOMATED COUNT: 12.9 % (ref 11.5–14.5)
ERYTHROCYTE [SEDIMENTATION RATE] IN BLOOD BY WESTERGREN METHOD: 15 MM/HR (ref 0–10)
HCT VFR BLD AUTO: 46.8 % (ref 40–54)
HGB BLD-MCNC: 15.8 G/DL (ref 14–18)
IMM GRANULOCYTES # BLD AUTO: 0.03 K/UL (ref 0–0.04)
IMM GRANULOCYTES NFR BLD AUTO: 0.4 % (ref 0–0.5)
LYMPHOCYTES # BLD AUTO: 1.7 K/UL (ref 1–4.8)
LYMPHOCYTES NFR BLD: 24.4 % (ref 18–48)
MAGNESIUM SERPL-MCNC: 1.9 MG/DL (ref 1.6–2.6)
MCH RBC QN AUTO: 31.2 PG (ref 27–31)
MCHC RBC AUTO-ENTMCNC: 33.8 G/DL (ref 32–36)
MCV RBC AUTO: 93 FL (ref 82–98)
MONOCYTES # BLD AUTO: 0.6 K/UL (ref 0.3–1)
MONOCYTES NFR BLD: 8.9 % (ref 4–15)
NEUTROPHILS # BLD AUTO: 4.5 K/UL (ref 1.8–7.7)
NEUTROPHILS NFR BLD: 63.2 % (ref 38–73)
NRBC BLD-RTO: 0 /100 WBC
PLATELET # BLD AUTO: 225 K/UL (ref 150–450)
PMV BLD AUTO: 9.9 FL (ref 9.2–12.9)
RBC # BLD AUTO: 5.06 M/UL (ref 4.6–6.2)
WBC # BLD AUTO: 7.06 K/UL (ref 3.9–12.7)

## 2021-12-17 PROCEDURE — 86140 C-REACTIVE PROTEIN: CPT | Performed by: EMERGENCY MEDICINE

## 2021-12-17 PROCEDURE — 85651 RBC SED RATE NONAUTOMATED: CPT | Performed by: EMERGENCY MEDICINE

## 2021-12-17 PROCEDURE — 73110 X-RAY EXAM OF WRIST: CPT | Mod: TC,FY,RT

## 2021-12-17 PROCEDURE — 82553 CREATINE MB FRACTION: CPT | Performed by: EMERGENCY MEDICINE

## 2021-12-17 PROCEDURE — 25000003 PHARM REV CODE 250: Performed by: EMERGENCY MEDICINE

## 2021-12-17 PROCEDURE — 99284 EMERGENCY DEPT VISIT MOD MDM: CPT | Mod: 25

## 2021-12-17 PROCEDURE — 73110 XR WRIST COMPLETE 3 VIEWS RIGHT: ICD-10-PCS | Mod: 26,RT,, | Performed by: RADIOLOGY

## 2021-12-17 PROCEDURE — 83735 ASSAY OF MAGNESIUM: CPT | Performed by: EMERGENCY MEDICINE

## 2021-12-17 PROCEDURE — 96367 TX/PROPH/DG ADDL SEQ IV INF: CPT

## 2021-12-17 PROCEDURE — 63600175 PHARM REV CODE 636 W HCPCS: Performed by: EMERGENCY MEDICINE

## 2021-12-17 PROCEDURE — 73110 X-RAY EXAM OF WRIST: CPT | Mod: 26,RT,, | Performed by: RADIOLOGY

## 2021-12-17 PROCEDURE — 96365 THER/PROPH/DIAG IV INF INIT: CPT

## 2021-12-17 PROCEDURE — 85025 COMPLETE CBC W/AUTO DIFF WBC: CPT | Performed by: EMERGENCY MEDICINE

## 2021-12-17 RX ORDER — HYDROCODONE BITARTRATE AND ACETAMINOPHEN 5; 325 MG/1; MG/1
1 TABLET ORAL EVERY 6 HOURS PRN
Qty: 12 TABLET | Refills: 0 | Status: SHIPPED | OUTPATIENT
Start: 2021-12-17 | End: 2022-04-04 | Stop reason: ALTCHOICE

## 2021-12-17 RX ORDER — CIPROFLOXACIN 500 MG/1
500 TABLET ORAL
COMMUNITY

## 2021-12-17 RX ORDER — LINEZOLID 600 MG/1
600 TABLET, FILM COATED ORAL
COMMUNITY
End: 2023-01-24 | Stop reason: CLARIF

## 2021-12-17 RX ORDER — HYDROCODONE BITARTRATE AND ACETAMINOPHEN 10; 325 MG/1; MG/1
1 TABLET ORAL
Status: COMPLETED | OUTPATIENT
Start: 2021-12-17 | End: 2021-12-17

## 2021-12-17 RX ADMIN — DAPTOMYCIN 600 MG: 350 INJECTION, POWDER, LYOPHILIZED, FOR SOLUTION INTRAVENOUS at 02:12

## 2021-12-17 RX ADMIN — ERTAPENEM SODIUM 1 G: 1 INJECTION, POWDER, LYOPHILIZED, FOR SOLUTION INTRAMUSCULAR; INTRAVENOUS at 01:12

## 2021-12-17 RX ADMIN — HYDROCODONE BITARTRATE AND ACETAMINOPHEN 1 TABLET: 10; 325 TABLET ORAL at 09:12

## 2021-12-17 NOTE — Clinical Note
"Sandeep"MARYSOL Hansen was seen and treated in our emergency department on 12/17/2021.  He may return to work on 12/27/2021.       If you have any questions or concerns, please don't hesitate to call.      John Wylie MD"

## 2021-12-17 NOTE — DISCHARGE INSTRUCTIONS
Follow-up with Dr. Ruff this coming Saturday it is office for another dose of medication.  Call the office today upon discharge to set this appointment up.  Return emergency with any worsening swelling, pain or otherwise.

## 2021-12-17 NOTE — ED PROVIDER NOTES
Encounter Date: 12/17/2021       History     Chief Complaint   Patient presents with    Osteomyelitis      41-year-old male with known history of osteomyelitis of the right great thumb comes emergency room with complaints of swelling in the wrist that started yesterday became worse today.  Was told by his infectious disease doctor to come to the emergency room if develops any swelling or pain in the hand.  Comes emergency room for further evaluation.  Denies any fevers or chills.  Has been compliant with medications.  Lincozid the last one he is on presently.  Denies any drainage.  Does have full range of motion.  Somewhat painful.  States the pain is 8/10.  Denies any trauma.  Has been treated for this for greater than 2 years.        Review of patient's allergies indicates:   Allergen Reactions    Toradol [ketorolac] Other (See Comments)     Chest pain      Past Medical History:   Diagnosis Date    Asthma     Blood transfusion, without reported diagnosis     Kidney stones     Neurofibromatosis     Stomach ulcer      Past Surgical History:   Procedure Laterality Date    LITHOTRIPSY Left     tumor removed from left arm       Family History   Problem Relation Age of Onset    Heart disease Mother     Hypertension Mother     Heart disease Father     Hypertension Father     No Known Problems Brother     No Known Problems Son      Social History     Tobacco Use    Smoking status: Current Every Day Smoker     Packs/day: 0.50     Types: Cigarettes    Smokeless tobacco: Never Used   Substance Use Topics    Alcohol use: Yes     Alcohol/week: 2.0 standard drinks     Types: 2 Standard drinks or equivalent per week     Comment: hx of liquor daily.     Drug use: No     Review of Systems   Constitutional: Negative.  Negative for fatigue and fever.   Respiratory: Negative.    Cardiovascular: Negative.    Gastrointestinal: Negative.    Musculoskeletal:        Right thumb with swelling and pain.   All other  systems reviewed and are negative.      Physical Exam     Initial Vitals [12/17/21 0838]   BP Pulse Resp Temp SpO2   (!) 143/90 80 20 98 °F (36.7 °C) 98 %      MAP       --         Physical Exam    Nursing note and vitals reviewed.  Constitutional: He appears well-developed and well-nourished.   HENT:   Head: Normocephalic.   Eyes: Pupils are equal, round, and reactive to light.   Neck: Neck supple.   Normal range of motion.  Cardiovascular: Normal rate, regular rhythm and normal heart sounds.   Pulmonary/Chest: Breath sounds normal.   Abdominal: Abdomen is soft.   Musculoskeletal:         General: Normal range of motion.      Cervical back: Normal range of motion and neck supple.      Comments: Right wrist with moderate swelling some warmth.  Normal capillary refill.  Neurovascular intact.  Right thumb with mild swelling.     Neurological: He is alert and oriented to person, place, and time. He has normal strength. GCS score is 15. GCS eye subscore is 4. GCS verbal subscore is 5. GCS motor subscore is 6.   Skin: Skin is warm. Capillary refill takes less than 2 seconds.   Psychiatric: He has a normal mood and affect. His behavior is normal. Thought content normal.         ED Course   Procedures  Labs Reviewed   CBC W/ AUTO DIFFERENTIAL - Abnormal; Notable for the following components:       Result Value    MCH 31.2 (*)     All other components within normal limits   SEDIMENTATION RATE - Abnormal; Notable for the following components:    Sed Rate 15 (*)     All other components within normal limits   CK-MB   MAGNESIUM   C-REACTIVE PROTEIN          Imaging Results          X-Ray Wrist Complete Right (Final result)  Result time 12/17/21 09:30:16    Final result by Manish Giraldo MD (12/17/21 09:30:16)                 Impression:      Generalized soft tissue swelling suspicious for cellulitis without definitive plain film evidence to suggest underlying osteomyelitis.      Electronically signed by: Manish  Orange  Date:    12/17/2021  Time:    09:30             Narrative:    EXAMINATION:  XR WRIST COMPLETE 3 VIEWS RIGHT    CLINICAL HISTORY:  Osteomyelitis, unspecified    TECHNIQUE:  PA, lateral, and oblique views of the right wrist were performed.    COMPARISON:  None.    FINDINGS:  There is mild generalized soft tissue swelling.  No soft tissue emphysema.  No focus of cortical erosion or periostitis to suggest plain film evidence of acute osteomyelitis.    The carpal bones are intact with mild degenerative osteoarthrosis.  Radiocarpal articulation intact.  Distal ulna and ulnar styloid intact.                                 Medications   DAPTOmycin (CUBICIN) 599 mg in sodium chloride 0.9% 11.98 mL IV syringe (conc: 50 mg/mL) (has no administration in time range)   ertapenem (INVANZ) 1 g in sodium chloride 0.9 % 100 mL IVPB (ready to mix system) (has no administration in time range)   HYDROcodone-acetaminophen  mg per tablet 1 tablet (1 tablet Oral Given 12/17/21 0955)     Medical Decision Making:   Differential Diagnosis:   Progression of osteomyelitis, trauma  ED Management:  Patient is stable this time.  I will do an x-ray of the wrist.  Evaluate to ESR and CRP.  Laboratory values.  Pain medications.  Attempt to contact his infectious disease doctor thereafter.    I have finally contacted Dr. Ruff, infectious Disease.  He I have sent him pictures of the arm.  He recommends that I give the patient daptomycin and ertapenem.  This all discharge patient.  He will contact Dr. Ruff.  He will go to the office this coming Saturday and Sunday for further doses daptomycin and ertapenem.                      Clinical Impression:   Final diagnoses:  [M86.9] Osteomyelitis  [M86.9] Osteomyelitis of right wrist (Primary)          ED Disposition Condition    Discharge Stable        ED Prescriptions     Medication Sig Dispense Start Date End Date Auth. Provider    HYDROcodone-acetaminophen (NORCO) 5-325 mg per tablet  Take 1 tablet by mouth every 6 (six) hours as needed for Pain. 12 tablet 12/17/2021  John Wylie MD        Follow-up Information     Follow up With Specialties Details Why Contact Info    Renata Erwin NP Family Medicine   [address] [place] [address] Dr  [place] [address] [phone]             John Wylie MD  12/17/21 3346

## 2021-12-17 NOTE — ED TRIAGE NOTES
Patient presents to ED POV with c/o right hand and wrist pain. He reports that he has been on antibiotics for over a year for osteomyelitis in his right hand. He reports that he woke up this morning with worsening pain and swelling. He is AAOx4. Skin warm, dry to touch. Respirations even, nonlabored. Ambulatory unassisted. He reports that he is currently on cipro and linezolid 600 mg q12h. He reports that he began linezolid three weeks ago.

## 2022-04-04 ENCOUNTER — HOSPITAL ENCOUNTER (EMERGENCY)
Facility: HOSPITAL | Age: 42
Discharge: HOME | End: 2022-04-04
Attending: EMERGENCY MEDICINE
Payer: OTHER MISCELLANEOUS

## 2022-04-04 VITALS
HEIGHT: 66 IN | BODY MASS INDEX: 32.14 KG/M2 | SYSTOLIC BLOOD PRESSURE: 121 MMHG | DIASTOLIC BLOOD PRESSURE: 76 MMHG | HEART RATE: 72 BPM | RESPIRATION RATE: 18 BRPM | TEMPERATURE: 98 F | WEIGHT: 200 LBS | OXYGEN SATURATION: 100 %

## 2022-04-04 DIAGNOSIS — M79.641 CHRONIC HAND PAIN, RIGHT: Primary | ICD-10-CM

## 2022-04-04 DIAGNOSIS — M79.641 RIGHT HAND PAIN: ICD-10-CM

## 2022-04-04 DIAGNOSIS — G89.29 CHRONIC HAND PAIN, RIGHT: Primary | ICD-10-CM

## 2022-04-04 DIAGNOSIS — R79.82 ELEVATED C-REACTIVE PROTEIN (CRP): ICD-10-CM

## 2022-04-04 LAB
ANION GAP SERPL CALC-SCNC: 11 MMOL/L (ref 8–16)
BASOPHILS # BLD AUTO: 0.06 K/UL (ref 0–0.2)
BASOPHILS NFR BLD: 0.7 % (ref 0–1.9)
BUN SERPL-MCNC: 15 MG/DL (ref 6–20)
CALCIUM SERPL-MCNC: 9.2 MG/DL (ref 8.7–10.5)
CHLORIDE SERPL-SCNC: 109 MMOL/L (ref 95–110)
CO2 SERPL-SCNC: 20 MMOL/L (ref 23–29)
CREAT SERPL-MCNC: 1 MG/DL (ref 0.5–1.4)
CRP SERPL-MCNC: 12 MG/L (ref 0–8.2)
DIFFERENTIAL METHOD: ABNORMAL
EOSINOPHIL # BLD AUTO: 0.2 K/UL (ref 0–0.5)
EOSINOPHIL NFR BLD: 2.3 % (ref 0–8)
ERYTHROCYTE [DISTWIDTH] IN BLOOD BY AUTOMATED COUNT: 13.1 % (ref 11.5–14.5)
ERYTHROCYTE [SEDIMENTATION RATE] IN BLOOD BY WESTERGREN METHOD: 12 MM/HR (ref 0–10)
EST. GFR  (AFRICAN AMERICAN): >60 ML/MIN/1.73 M^2
EST. GFR  (NON AFRICAN AMERICAN): >60 ML/MIN/1.73 M^2
GLUCOSE SERPL-MCNC: 91 MG/DL (ref 70–110)
HCT VFR BLD AUTO: 47.5 % (ref 40–54)
HGB BLD-MCNC: 15.7 G/DL (ref 14–18)
IMM GRANULOCYTES # BLD AUTO: 0.05 K/UL (ref 0–0.04)
IMM GRANULOCYTES NFR BLD AUTO: 0.6 % (ref 0–0.5)
LYMPHOCYTES # BLD AUTO: 2 K/UL (ref 1–4.8)
LYMPHOCYTES NFR BLD: 23.9 % (ref 18–48)
MCH RBC QN AUTO: 31.2 PG (ref 27–31)
MCHC RBC AUTO-ENTMCNC: 33.1 G/DL (ref 32–36)
MCV RBC AUTO: 94 FL (ref 82–98)
MONOCYTES # BLD AUTO: 0.7 K/UL (ref 0.3–1)
MONOCYTES NFR BLD: 8.4 % (ref 4–15)
NEUTROPHILS # BLD AUTO: 5.3 K/UL (ref 1.8–7.7)
NEUTROPHILS NFR BLD: 64.1 % (ref 38–73)
NRBC BLD-RTO: 0 /100 WBC
PLATELET # BLD AUTO: 274 K/UL (ref 150–450)
PMV BLD AUTO: 10.5 FL (ref 9.2–12.9)
POTASSIUM SERPL-SCNC: 4.1 MMOL/L (ref 3.5–5.1)
RBC # BLD AUTO: 5.04 M/UL (ref 4.6–6.2)
SODIUM SERPL-SCNC: 140 MMOL/L (ref 136–145)
WBC # BLD AUTO: 8.21 K/UL (ref 3.9–12.7)

## 2022-04-04 PROCEDURE — 73130 X-RAY EXAM OF HAND: CPT | Mod: TC,FY,RT

## 2022-04-04 PROCEDURE — 85025 COMPLETE CBC W/AUTO DIFF WBC: CPT | Performed by: NURSE PRACTITIONER

## 2022-04-04 PROCEDURE — 73130 XR HAND COMPLETE 3 VIEW RIGHT: ICD-10-PCS | Mod: 26,RT,, | Performed by: RADIOLOGY

## 2022-04-04 PROCEDURE — 36415 COLL VENOUS BLD VENIPUNCTURE: CPT | Performed by: NURSE PRACTITIONER

## 2022-04-04 PROCEDURE — 86140 C-REACTIVE PROTEIN: CPT | Performed by: NURSE PRACTITIONER

## 2022-04-04 PROCEDURE — 85651 RBC SED RATE NONAUTOMATED: CPT | Performed by: NURSE PRACTITIONER

## 2022-04-04 PROCEDURE — 96372 THER/PROPH/DIAG INJ SC/IM: CPT | Performed by: NURSE PRACTITIONER

## 2022-04-04 PROCEDURE — 63600175 PHARM REV CODE 636 W HCPCS: Performed by: NURSE PRACTITIONER

## 2022-04-04 PROCEDURE — 99284 EMERGENCY DEPT VISIT MOD MDM: CPT

## 2022-04-04 PROCEDURE — 73130 X-RAY EXAM OF HAND: CPT | Mod: 26,RT,, | Performed by: RADIOLOGY

## 2022-04-04 PROCEDURE — 80048 BASIC METABOLIC PNL TOTAL CA: CPT | Performed by: NURSE PRACTITIONER

## 2022-04-04 PROCEDURE — 25000003 PHARM REV CODE 250: Performed by: NURSE PRACTITIONER

## 2022-04-04 RX ORDER — ALBUTEROL SULFATE 90 UG/1
AEROSOL, METERED RESPIRATORY (INHALATION)
COMMUNITY
Start: 2022-02-16

## 2022-04-04 RX ORDER — ORPHENADRINE CITRATE 30 MG/ML
30 INJECTION INTRAMUSCULAR; INTRAVENOUS
Status: COMPLETED | OUTPATIENT
Start: 2022-04-04 | End: 2022-04-04

## 2022-04-04 RX ORDER — HYDROCODONE BITARTRATE AND ACETAMINOPHEN 10; 325 MG/1; MG/1
1 TABLET ORAL
Status: COMPLETED | OUTPATIENT
Start: 2022-04-04 | End: 2022-04-04

## 2022-04-04 RX ORDER — TRAMADOL HYDROCHLORIDE 50 MG/1
50 TABLET ORAL EVERY 6 HOURS PRN
Qty: 12 TABLET | Refills: 0 | Status: SHIPPED | OUTPATIENT
Start: 2022-04-04 | End: 2023-01-24 | Stop reason: CLARIF

## 2022-04-04 RX ADMIN — HYDROCODONE BITARTRATE AND ACETAMINOPHEN 1 TABLET: 10; 325 TABLET ORAL at 06:04

## 2022-04-04 RX ADMIN — ORPHENADRINE CITRATE 30 MG: 30 INJECTION INTRAMUSCULAR; INTRAVENOUS at 06:04

## 2022-04-04 NOTE — ED PROVIDER NOTES
Encounter Date: 4/4/2022       History     Chief Complaint   Patient presents with    Hand Pain     Patient reported he had history of osteomyelitis in December, treated with iv antibiotics for weeks. Right hand pain with mild swelling came back 1 week ago, wanted to get checked if he had flare up of osteomyelitis.     The history is provided by the patient.   Hand Pain  This is a chronic problem. The current episode started more than 2 days ago. The problem occurs constantly. The problem has been gradually worsening. Pertinent negatives include no chest pain, no abdominal pain, no headaches and no shortness of breath. Exacerbated by: direct palpation. Nothing relieves the symptoms. He has tried nothing for the symptoms. The treatment provided no relief.     Review of patient's allergies indicates:   Allergen Reactions    Ketorolac Other (See Comments) and Palpitations     Chest pain      Past Medical History:   Diagnosis Date    Asthma     Blood transfusion, without reported diagnosis     Kidney stones     Neurofibromatosis     Stomach ulcer      Past Surgical History:   Procedure Laterality Date    LITHOTRIPSY Left     tumor removed from left arm       Family History   Problem Relation Age of Onset    Heart disease Mother     Hypertension Mother     Heart disease Father     Hypertension Father     No Known Problems Brother     No Known Problems Son      Social History     Tobacco Use    Smoking status: Current Every Day Smoker     Packs/day: 0.50     Types: Cigarettes    Smokeless tobacco: Never Used   Substance Use Topics    Alcohol use: Yes     Alcohol/week: 2.0 standard drinks     Types: 2 Standard drinks or equivalent per week     Comment: hx of liquor daily.     Drug use: No     Review of Systems   Respiratory: Negative for shortness of breath.    Cardiovascular: Negative for chest pain.   Gastrointestinal: Negative for abdominal pain.   Skin: Positive for color change.   Neurological:  Negative for headaches.   All other systems reviewed and are negative.      Physical Exam     Initial Vitals [04/04/22 1538]   BP Pulse Resp Temp SpO2   (!) 155/97 92 18 98.2 °F (36.8 °C) 95 %      MAP       --         Physical Exam    Nursing note and vitals reviewed.  Constitutional: He appears well-developed.   HENT:   Head: Normocephalic and atraumatic.   Eyes: Pupils are equal, round, and reactive to light.   Neck: Neck supple.   Normal range of motion.  Cardiovascular: Normal rate.   Pulmonary/Chest: Breath sounds normal.   Musculoskeletal:         General: Normal range of motion.      Right hand: Swelling (metacarpal dorsal aspect), tenderness and bony tenderness present. No lacerations. Normal range of motion. Normal strength. Normal sensation.      Cervical back: Normal range of motion and neck supple.     Neurological: He is alert and oriented to person, place, and time.   Skin: Skin is warm and dry.   Psychiatric: He has a normal mood and affect.         ED Course   Procedures  Labs Reviewed   C-REACTIVE PROTEIN - Abnormal; Notable for the following components:       Result Value    CRP 12.0 (*)     All other components within normal limits   SEDIMENTATION RATE - Abnormal; Notable for the following components:    Sed Rate 12 (*)     All other components within normal limits   CBC W/ AUTO DIFFERENTIAL - Abnormal; Notable for the following components:    MCH 31.2 (*)     Immature Granulocytes 0.6 (*)     Immature Grans (Abs) 0.05 (*)     All other components within normal limits   BASIC METABOLIC PANEL - Abnormal; Notable for the following components:    CO2 20 (*)     All other components within normal limits          Imaging Results          X-Ray Hand 3 View Right (Edited Result - FINAL)  Result time 04/04/22 17:07:20    Addendum 1 of 1 by Francisco Rivera MD (04/04/22 17:07:20)      Additional comparison was made to wrist radiograph from 12/17/2021.    Subchondral lucency along the tuft of the thumb  distal phalanx is unchanged relative to the comparison study.  There is no bone erosion elsewhere to suggest a new focus of osteomyelitis.    Findings were discussed with Valerie CARRANZA via telephone at 17:00 hours.      Electronically signed by: Francisco Rivera  Date:    04/04/2022  Time:    17:07                 Final result by Francisco Rivera MD (04/04/22 16:02:09)                 Narrative:    EXAMINATION:  XR HAND COMPLETE 3 VIEW RIGHT    CLINICAL HISTORY:  right hand pain;    TECHNIQUE:  PA, lateral, and oblique views of the right hand were performed.    COMPARISON:  None    FINDINGS:  There is cortical deformity along the tip of the thumb distal phalanx suggesting age-indeterminate trauma, favored remote in age.  No other acute fracture.  No malalignment.  Minimal degenerative change triscaphe joint.  Mild degenerative change thumb IP joint.  Mild degenerative change at the radiolunate articulation.  No chondrocalcinosis.      Electronically signed by: Francisco Rivera  Date:    04/04/2022  Time:    16:02                               Medications   HYDROcodone-acetaminophen  mg per tablet 1 tablet (1 tablet Oral Given 4/4/22 1853)   orphenadrine injection 30 mg (30 mg Intramuscular Given 4/4/22 1853)     Medical Decision Making:   Differential Diagnosis:   Osteomyelitis, chronic hand pain  ED Management:  Dr. Pavon examined patient in person, aware of elevated CRP, Sed rate which presented chronic osteomyelitis condition.   Patient was advised to follow up with hand surgeon ASAP, Infectious disease specialist in a day for bone scan and MRI because he didn't have any follow up diagnostic test followed by IV antibiotics for osteomyelitis.  Short course of pain management until he sees hand specialist.   Please return for new, changing, or worsening pain. The patient was also instructed that follow up with a primary care physician is strongly recommended after any visit to the ED.  Patient expressed  understanding and agreed with treatment plan and was discharged in stable condition.                ED Course as of 04/04/22 1932 Mon Apr 04, 2022 1830 C-reactive protein(!) [PW]   1830 Sed Rate(!): 12 [PW]   1830 CBC auto differential(!) [PW]   1830 Basic metabolic panel(!) [PW]   1839 And apparently had a workman's comp injury in January of 2021.  He underwent treatment with IV antibiotics infusion and surgery.  And December 2021 he underwent 3 more treatments of outpatient antibiotics for osteomyelitis.  He states he was last seen by his Infectious Disease doctor approximately 1 month ago.  He states he has not had a bone scan or MRI since his treatment.  He said he still has some pain and swelling in the joint, this is a workman's comp, and he does have legal representation.  Advised this point that the patient he is follows up with his Infectious Disease Control doctor and his hand doctor for further evaluation and MRI and bone scan studies.  He states though he is getting a new hand doctor that some orthopedic hand doctor [employer], and they are waiting for the appointment for him. [PW]      ED Course User Index  [PW] Ramon Pavon MD             Clinical Impression:   Final diagnoses:  [M79.641] Right hand pain  [M79.641, G89.29] Chronic hand pain, right (Primary)  [R79.82] Elevated C-reactive protein (CRP)          ED Disposition Condition    Discharge Stable        ED Prescriptions     Medication Sig Dispense Start Date End Date Auth. Provider    traMADoL (ULTRAM) 50 mg tablet Take 1 tablet (50 mg total) by mouth every 6 (six) hours as needed for Pain. 12 tablet 4/4/2022  Juancarlos Padilla NP        Follow-up Information     Follow up With Specialties Details Why Contact Info    Hand surgeon  Schedule an appointment as soon as possible for a visit in 1 day      [facility] Emergency Dept Emergency Medicine  If symptoms worsen [address] [place] [address] [phone]            Juancarlos Padilla NP  04/04/22 1932

## 2022-04-05 NOTE — ED NOTES
Pt left paper script at discharge.Pt contacted and prescription left at . Pt stated he would come pick it up tomorrow.

## 2023-01-24 ENCOUNTER — HOSPITAL ENCOUNTER (EMERGENCY)
Facility: HOSPITAL | Age: 43
Discharge: HOME | End: 2023-01-24
Attending: EMERGENCY MEDICINE
Payer: OTHER MISCELLANEOUS

## 2023-01-24 VITALS
HEART RATE: 76 BPM | SYSTOLIC BLOOD PRESSURE: 149 MMHG | WEIGHT: 210 LBS | DIASTOLIC BLOOD PRESSURE: 80 MMHG | OXYGEN SATURATION: 96 % | HEIGHT: 66 IN | TEMPERATURE: 98 F | BODY MASS INDEX: 33.75 KG/M2 | RESPIRATION RATE: 16 BRPM

## 2023-01-24 DIAGNOSIS — M19.041 OSTEOARTHRITIS OF RIGHT HAND, UNSPECIFIED OSTEOARTHRITIS TYPE: ICD-10-CM

## 2023-01-24 DIAGNOSIS — M79.641 CHRONIC HAND PAIN, RIGHT: Primary | ICD-10-CM

## 2023-01-24 DIAGNOSIS — G89.29 CHRONIC HAND PAIN, RIGHT: Primary | ICD-10-CM

## 2023-01-24 LAB
ALBUMIN SERPL BCP-MCNC: 4.1 G/DL (ref 3.5–5.2)
ALP SERPL-CCNC: 85 U/L (ref 55–135)
ALT SERPL W/O P-5'-P-CCNC: 30 U/L (ref 10–44)
ANION GAP SERPL CALC-SCNC: 9 MMOL/L (ref 8–16)
AST SERPL-CCNC: 21 U/L (ref 10–40)
BASOPHILS # BLD AUTO: 0.08 K/UL (ref 0–0.2)
BASOPHILS NFR BLD: 0.9 % (ref 0–1.9)
BILIRUB SERPL-MCNC: 0.2 MG/DL (ref 0.1–1)
BUN SERPL-MCNC: 19 MG/DL (ref 6–20)
CALCIUM SERPL-MCNC: 9.1 MG/DL (ref 8.7–10.5)
CHLORIDE SERPL-SCNC: 110 MMOL/L (ref 95–110)
CO2 SERPL-SCNC: 22 MMOL/L (ref 23–29)
CREAT SERPL-MCNC: 1 MG/DL (ref 0.5–1.4)
CRP SERPL-MCNC: 5.6 MG/L (ref 0–8.2)
DIFFERENTIAL METHOD: ABNORMAL
EOSINOPHIL # BLD AUTO: 0.2 K/UL (ref 0–0.5)
EOSINOPHIL NFR BLD: 1.7 % (ref 0–8)
ERYTHROCYTE [DISTWIDTH] IN BLOOD BY AUTOMATED COUNT: 12.5 % (ref 11.5–14.5)
ERYTHROCYTE [SEDIMENTATION RATE] IN BLOOD BY WESTERGREN METHOD: 16 MM/HR (ref 0–10)
EST. GFR  (NO RACE VARIABLE): >60 ML/MIN/1.73 M^2
GLUCOSE SERPL-MCNC: 97 MG/DL (ref 70–110)
HCT VFR BLD AUTO: 46.5 % (ref 40–54)
HGB BLD-MCNC: 15.5 G/DL (ref 14–18)
IMM GRANULOCYTES # BLD AUTO: 0.05 K/UL (ref 0–0.04)
IMM GRANULOCYTES NFR BLD AUTO: 0.5 % (ref 0–0.5)
LYMPHOCYTES # BLD AUTO: 2.5 K/UL (ref 1–4.8)
LYMPHOCYTES NFR BLD: 26.8 % (ref 18–48)
MCH RBC QN AUTO: 31.6 PG (ref 27–31)
MCHC RBC AUTO-ENTMCNC: 33.3 G/DL (ref 32–36)
MCV RBC AUTO: 95 FL (ref 82–98)
MONOCYTES # BLD AUTO: 0.7 K/UL (ref 0.3–1)
MONOCYTES NFR BLD: 7.8 % (ref 4–15)
NEUTROPHILS # BLD AUTO: 5.8 K/UL (ref 1.8–7.7)
NEUTROPHILS NFR BLD: 62.3 % (ref 38–73)
NRBC BLD-RTO: 0 /100 WBC
PLATELET # BLD AUTO: 303 K/UL (ref 150–450)
PMV BLD AUTO: 10 FL (ref 9.2–12.9)
POTASSIUM SERPL-SCNC: 4.1 MMOL/L (ref 3.5–5.1)
PROT SERPL-MCNC: 7.5 G/DL (ref 6–8.4)
RBC # BLD AUTO: 4.9 M/UL (ref 4.6–6.2)
SODIUM SERPL-SCNC: 141 MMOL/L (ref 136–145)
WBC # BLD AUTO: 9.36 K/UL (ref 3.9–12.7)

## 2023-01-24 PROCEDURE — 99284 EMERGENCY DEPT VISIT MOD MDM: CPT | Mod: 25

## 2023-01-24 PROCEDURE — 85025 COMPLETE CBC W/AUTO DIFF WBC: CPT | Performed by: NURSE PRACTITIONER

## 2023-01-24 PROCEDURE — 73200 CT HAND WITHOUT CONTRAST RIGHT: ICD-10-PCS | Mod: 26,RT,, | Performed by: RADIOLOGY

## 2023-01-24 PROCEDURE — 73200 CT UPPER EXTREMITY W/O DYE: CPT | Mod: TC,RT

## 2023-01-24 PROCEDURE — 73200 CT UPPER EXTREMITY W/O DYE: CPT | Mod: 26,RT,, | Performed by: RADIOLOGY

## 2023-01-24 PROCEDURE — 85651 RBC SED RATE NONAUTOMATED: CPT | Performed by: NURSE PRACTITIONER

## 2023-01-24 PROCEDURE — 25000003 PHARM REV CODE 250: Performed by: NURSE PRACTITIONER

## 2023-01-24 PROCEDURE — 86140 C-REACTIVE PROTEIN: CPT | Performed by: NURSE PRACTITIONER

## 2023-01-24 PROCEDURE — 80053 COMPREHEN METABOLIC PANEL: CPT | Performed by: NURSE PRACTITIONER

## 2023-01-24 PROCEDURE — 36415 COLL VENOUS BLD VENIPUNCTURE: CPT | Performed by: NURSE PRACTITIONER

## 2023-01-24 RX ORDER — HYDROXYZINE PAMOATE 25 MG/1
25 CAPSULE ORAL
COMMUNITY
Start: 2023-01-06

## 2023-01-24 RX ORDER — ESCITALOPRAM OXALATE 20 MG/1
20 TABLET ORAL
COMMUNITY
Start: 2023-01-09

## 2023-01-24 RX ORDER — TRAZODONE HYDROCHLORIDE 150 MG/1
TABLET ORAL
COMMUNITY
Start: 2022-12-19

## 2023-01-24 RX ORDER — HYDROCODONE BITARTRATE AND ACETAMINOPHEN 10; 325 MG/1; MG/1
1 TABLET ORAL
Status: COMPLETED | OUTPATIENT
Start: 2023-01-24 | End: 2023-01-24

## 2023-01-24 RX ADMIN — HYDROCODONE BITARTRATE AND ACETAMINOPHEN 1 TABLET: 10; 325 TABLET ORAL at 04:01

## 2023-01-25 NOTE — ED PROVIDER NOTES
Encounter Date: 1/24/2023       History     Chief Complaint   Patient presents with    Hand Pain     Pt reports right thumb pain and swelling s/p painting at work today. Pt unsure if he hit it on anything. Pt has hx of bone graft to that finger approx 6 months ago.      42-year-old patient presented to ER with a complaint of redness of the right thumb, has a history of bone graft in his right thumb 6 months ago wanted to get checked it out    The history is provided by the patient.   Hand Pain  This is a chronic problem. The current episode started yesterday. The problem occurs constantly. The problem has not changed since onset.Pertinent negatives include no chest pain, no abdominal pain, no headaches and no shortness of breath. Exacerbated by: Movement of thumb. Nothing relieves the symptoms. He has tried nothing for the symptoms.   Review of patient's allergies indicates:   Allergen Reactions    Ketorolac Other (See Comments) and Palpitations     Chest pain      Past Medical History:   Diagnosis Date    Asthma     Blood transfusion, without reported diagnosis     Kidney stones     Neurofibromatosis     Stomach ulcer      Past Surgical History:   Procedure Laterality Date    LITHOTRIPSY Left     tumor removed from left arm       Family History   Problem Relation Age of Onset    Heart disease Mother     Hypertension Mother     Heart disease Father     Hypertension Father     No Known Problems Brother     No Known Problems Son      Social History     Tobacco Use    Smoking status: Every Day     Packs/day: 0.50     Types: Cigarettes    Smokeless tobacco: Never   Substance Use Topics    Alcohol use: Yes     Alcohol/week: 2.0 standard drinks     Types: 2 Standard drinks or equivalent per week     Comment: hx of liquor daily.     Drug use: No     Review of Systems   Respiratory:  Negative for shortness of breath.    Cardiovascular:  Negative for chest pain.   Gastrointestinal:  Negative for abdominal pain.    Musculoskeletal:  Positive for arthralgias and myalgias.   Skin:  Positive for color change.   Neurological:  Negative for headaches.   All other systems reviewed and are negative.    Physical Exam     Initial Vitals [01/24/23 1513]   BP Pulse Resp Temp SpO2   (!) 149/80 76 18 97.8 °F (36.6 °C) 96 %      MAP       --         Physical Exam    Vitals reviewed.  Constitutional: He appears well-developed and well-nourished.   HENT:   Head: Normocephalic.   Cardiovascular:  Normal rate.           Pulmonary/Chest: Breath sounds normal.   Musculoskeletal:      Right hand: Tenderness (Right thumb) and bony tenderness present. Normal capillary refill. Normal pulse.      Left hand: Normal capillary refill. Normal pulse.     Neurological: GCS score is 15. GCS eye subscore is 4. GCS verbal subscore is 5. GCS motor subscore is 6.   Skin: Capillary refill takes less than 2 seconds.   Psychiatric: He has a normal mood and affect.       ED Course   Procedures  Labs Reviewed   CBC W/ AUTO DIFFERENTIAL - Abnormal; Notable for the following components:       Result Value    MCH 31.6 (*)     Immature Grans (Abs) 0.05 (*)     All other components within normal limits   COMPREHENSIVE METABOLIC PANEL - Abnormal; Notable for the following components:    CO2 22 (*)     All other components within normal limits   SEDIMENTATION RATE - Abnormal; Notable for the following components:    Sed Rate 16 (*)     All other components within normal limits   C-REACTIVE PROTEIN   SEDIMENTATION RATE   C-REACTIVE PROTEIN          Imaging Results              CT Hand Without Contrast Right (Final result)  Result time 01/24/23 16:06:54      Final result by Manish Giraldo MD (01/24/23 16:06:54)                   Impression:      1. Posttraumatic deformity distal phalanx of the thumb.  2. Mild degenerative osteoarthrosis of the thumb.      Electronically signed by: Manish Giraldo  Date:    01/24/2023  Time:    16:06               Narrative:     EXAMINATION:  CT HAND WITHOUT CONTRAST RIGHT    CLINICAL HISTORY:  Hand pain, chronic, osteoarthritis suspected;bone graft 6 months ago, started having pain;    TECHNIQUE:  Multiple noncontrast contiguous axial images of the right hand.  Coronal and sagittal re-formatted images reconstructed.    COMPARISON:  04/04/2022.    FINDINGS:  No acute fracture or dislocation.  No significant soft tissue swelling.  There is posttraumatic deformity involving the distal phalanx of the thumb.    Mild degenerative osteoarthrosis involving the IP joint of the thumb with mild joint space narrowing and small osteophyte formation.  Remaining IP joint spaces are preserved.  MCP joint spaces are preserved.  Carpal bones are intact and normal in appearance.  Distal radius and ulna intact.    Muscles of the hand are normal in course, contour and attenuation.  Partially visualized flexor and extensor tendons appear intact.                                       Medications   HYDROcodone-acetaminophen  mg per tablet 1 tablet (1 tablet Oral Given 1/24/23 1652)     Medical Decision Making:   Differential Diagnosis:   Arthritis, osteomyelitis, fractures sprain cellulitis  Clinical Tests:   Radiological Study: Ordered and Reviewed  ED Management:  negative osteomyelitis, positive to osteoarthritis   Referral to pain clinic  Patient was advised to use gloves at work, follow-up with his hand surgeon  Pt is a , reinforced the importance to wear gloves when working with hazardous chemicals, protection from infection and contamination since he had long history of osteomyelitis and bone graft. please return for new, changing, or worsening pain.  Patient expressed understanding and agreed with treatment plan and was discharged in stable condition.                               Clinical Impression:   Final diagnoses:  [M79.641, G89.29] Chronic hand pain, right (Primary)  [M19.041] Osteoarthritis of right hand, unspecified osteoarthritis  type        ED Disposition Condition    Discharge Stable          ED Prescriptions    None       Follow-up Information       Follow up With Specialties Details Why Contact Info    Beny Cervantes MD Hand Surgery In 2 days  [address]  [address]  [facility]  [facility] [address]  [phone]      hand surgeon  Schedule an appointment as soon as possible for a visit       [facility] Emergency Dept Emergency Medicine  If symptoms worsen [address] [address] [phone]             Juancarlos Padilla NP  01/25/23 0612

## 2024-03-14 ENCOUNTER — TELEPHONE (OUTPATIENT)
Dept: EMERGENCY MEDICINE | Facility: HOSPITAL | Age: 44
End: 2024-03-14
Payer: COMMERCIAL

## 2024-03-14 ENCOUNTER — HOSPITAL ENCOUNTER (EMERGENCY)
Facility: HOSPITAL | Age: 44
Discharge: HOME | End: 2024-03-14
Attending: STUDENT IN AN ORGANIZED HEALTH CARE EDUCATION/TRAINING PROGRAM
Payer: COMMERCIAL

## 2024-03-14 VITALS
SYSTOLIC BLOOD PRESSURE: 127 MMHG | HEIGHT: 65 IN | RESPIRATION RATE: 14 BRPM | WEIGHT: 200 LBS | OXYGEN SATURATION: 95 % | TEMPERATURE: 98 F | HEART RATE: 72 BPM | DIASTOLIC BLOOD PRESSURE: 82 MMHG | BODY MASS INDEX: 33.32 KG/M2

## 2024-03-14 DIAGNOSIS — R10.13 EPIGASTRIC PAIN: Primary | ICD-10-CM

## 2024-03-14 DIAGNOSIS — K52.9 ENTERITIS: ICD-10-CM

## 2024-03-14 DIAGNOSIS — R19.7 DIARRHEA, UNSPECIFIED TYPE: ICD-10-CM

## 2024-03-14 LAB
ALBUMIN SERPL BCP-MCNC: 4 G/DL (ref 3.5–5.2)
ALP SERPL-CCNC: 96 U/L (ref 55–135)
ALT SERPL W/O P-5'-P-CCNC: 68 U/L (ref 10–44)
ANION GAP SERPL CALC-SCNC: 12 MMOL/L (ref 8–16)
AST SERPL-CCNC: 40 U/L (ref 10–40)
BASOPHILS # BLD AUTO: 0.08 K/UL (ref 0–0.2)
BASOPHILS NFR BLD: 1 % (ref 0–1.9)
BILIRUB SERPL-MCNC: 0.6 MG/DL (ref 0.1–1)
BILIRUB UR QL STRIP: NEGATIVE
BUN SERPL-MCNC: 11 MG/DL (ref 6–20)
CALCIUM SERPL-MCNC: 9.3 MG/DL (ref 8.7–10.5)
CHLORIDE SERPL-SCNC: 108 MMOL/L (ref 95–110)
CLARITY UR: CLEAR
CO2 SERPL-SCNC: 20 MMOL/L (ref 23–29)
COLOR UR: YELLOW
CREAT SERPL-MCNC: 1 MG/DL (ref 0.5–1.4)
DIFFERENTIAL METHOD BLD: ABNORMAL
EOSINOPHIL # BLD AUTO: 0.2 K/UL (ref 0–0.5)
EOSINOPHIL NFR BLD: 1.9 % (ref 0–8)
ERYTHROCYTE [DISTWIDTH] IN BLOOD BY AUTOMATED COUNT: 12.7 % (ref 11.5–14.5)
EST. GFR  (NO RACE VARIABLE): >60 ML/MIN/1.73 M^2
GLUCOSE SERPL-MCNC: 143 MG/DL (ref 70–110)
GLUCOSE UR QL STRIP: NEGATIVE
HCT VFR BLD AUTO: 48.8 % (ref 40–54)
HCV AB SERPL QL IA: REACTIVE
HGB BLD-MCNC: 16.2 G/DL (ref 14–18)
HGB UR QL STRIP: NEGATIVE
HIV 1+2 AB+HIV1 P24 AG SERPL QL IA: NORMAL
IMM GRANULOCYTES # BLD AUTO: 0.04 K/UL (ref 0–0.04)
IMM GRANULOCYTES NFR BLD AUTO: 0.5 % (ref 0–0.5)
INFLUENZA A, MOLECULAR: NEGATIVE
INFLUENZA B, MOLECULAR: NEGATIVE
KETONES UR QL STRIP: NEGATIVE
LEUKOCYTE ESTERASE UR QL STRIP: NEGATIVE
LIPASE SERPL-CCNC: 242 U/L (ref 4–60)
LYMPHOCYTES # BLD AUTO: 1.9 K/UL (ref 1–4.8)
LYMPHOCYTES NFR BLD: 24.1 % (ref 18–48)
MCH RBC QN AUTO: 31.4 PG (ref 27–31)
MCHC RBC AUTO-ENTMCNC: 33.2 G/DL (ref 32–36)
MCV RBC AUTO: 95 FL (ref 82–98)
MONOCYTES # BLD AUTO: 0.6 K/UL (ref 0.3–1)
MONOCYTES NFR BLD: 7.7 % (ref 4–15)
NEUTROPHILS # BLD AUTO: 5 K/UL (ref 1.8–7.7)
NEUTROPHILS NFR BLD: 64.8 % (ref 38–73)
NITRITE UR QL STRIP: NEGATIVE
NRBC BLD-RTO: 0 /100 WBC
PH UR STRIP: 6 [PH] (ref 5–8)
PLATELET # BLD AUTO: 280 K/UL (ref 150–450)
PMV BLD AUTO: 10.4 FL (ref 9.2–12.9)
POTASSIUM SERPL-SCNC: 3.8 MMOL/L (ref 3.5–5.1)
PROT SERPL-MCNC: 7.6 G/DL (ref 6–8.4)
PROT UR QL STRIP: NEGATIVE
RBC # BLD AUTO: 5.16 M/UL (ref 4.6–6.2)
SARS-COV-2 RDRP RESP QL NAA+PROBE: NEGATIVE
SODIUM SERPL-SCNC: 140 MMOL/L (ref 136–145)
SP GR UR STRIP: 1.02 (ref 1–1.03)
SPECIMEN SOURCE: NORMAL
URN SPEC COLLECT METH UR: NORMAL
UROBILINOGEN UR STRIP-ACNC: NEGATIVE EU/DL
WBC # BLD AUTO: 7.75 K/UL (ref 3.9–12.7)

## 2024-03-14 PROCEDURE — 81003 URINALYSIS AUTO W/O SCOPE: CPT | Performed by: STUDENT IN AN ORGANIZED HEALTH CARE EDUCATION/TRAINING PROGRAM

## 2024-03-14 PROCEDURE — 83690 ASSAY OF LIPASE: CPT | Performed by: STUDENT IN AN ORGANIZED HEALTH CARE EDUCATION/TRAINING PROGRAM

## 2024-03-14 PROCEDURE — 96374 THER/PROPH/DIAG INJ IV PUSH: CPT

## 2024-03-14 PROCEDURE — 80053 COMPREHEN METABOLIC PANEL: CPT | Performed by: STUDENT IN AN ORGANIZED HEALTH CARE EDUCATION/TRAINING PROGRAM

## 2024-03-14 PROCEDURE — 87389 HIV-1 AG W/HIV-1&-2 AB AG IA: CPT | Performed by: EMERGENCY MEDICINE

## 2024-03-14 PROCEDURE — 87502 INFLUENZA DNA AMP PROBE: CPT | Performed by: STUDENT IN AN ORGANIZED HEALTH CARE EDUCATION/TRAINING PROGRAM

## 2024-03-14 PROCEDURE — 25000003 PHARM REV CODE 250: Performed by: STUDENT IN AN ORGANIZED HEALTH CARE EDUCATION/TRAINING PROGRAM

## 2024-03-14 PROCEDURE — C9113 INJ PANTOPRAZOLE SODIUM, VIA: HCPCS | Performed by: STUDENT IN AN ORGANIZED HEALTH CARE EDUCATION/TRAINING PROGRAM

## 2024-03-14 PROCEDURE — 86803 HEPATITIS C AB TEST: CPT | Performed by: EMERGENCY MEDICINE

## 2024-03-14 PROCEDURE — 74176 CT ABD & PELVIS W/O CONTRAST: CPT | Mod: 26,,, | Performed by: RADIOLOGY

## 2024-03-14 PROCEDURE — 96361 HYDRATE IV INFUSION ADD-ON: CPT

## 2024-03-14 PROCEDURE — U0002 COVID-19 LAB TEST NON-CDC: HCPCS | Performed by: STUDENT IN AN ORGANIZED HEALTH CARE EDUCATION/TRAINING PROGRAM

## 2024-03-14 PROCEDURE — 96375 TX/PRO/DX INJ NEW DRUG ADDON: CPT

## 2024-03-14 PROCEDURE — 63600175 PHARM REV CODE 636 W HCPCS: Mod: JZ,JG | Performed by: STUDENT IN AN ORGANIZED HEALTH CARE EDUCATION/TRAINING PROGRAM

## 2024-03-14 PROCEDURE — 85025 COMPLETE CBC W/AUTO DIFF WBC: CPT | Performed by: STUDENT IN AN ORGANIZED HEALTH CARE EDUCATION/TRAINING PROGRAM

## 2024-03-14 PROCEDURE — 74176 CT ABD & PELVIS W/O CONTRAST: CPT | Mod: TC

## 2024-03-14 PROCEDURE — 99285 EMERGENCY DEPT VISIT HI MDM: CPT | Mod: 25

## 2024-03-14 RX ORDER — ONDANSETRON HYDROCHLORIDE 2 MG/ML
4 INJECTION, SOLUTION INTRAVENOUS
Status: COMPLETED | OUTPATIENT
Start: 2024-03-14 | End: 2024-03-14

## 2024-03-14 RX ORDER — ALUMINUM HYDROXIDE, MAGNESIUM HYDROXIDE, AND SIMETHICONE 1200; 120; 1200 MG/30ML; MG/30ML; MG/30ML
30 SUSPENSION ORAL
Status: DISCONTINUED | OUTPATIENT
Start: 2024-03-14 | End: 2024-03-14

## 2024-03-14 RX ORDER — PANTOPRAZOLE SODIUM 40 MG/10ML
40 INJECTION, POWDER, LYOPHILIZED, FOR SOLUTION INTRAVENOUS
Status: COMPLETED | OUTPATIENT
Start: 2024-03-14 | End: 2024-03-14

## 2024-03-14 RX ORDER — METRONIDAZOLE 500 MG/1
500 TABLET ORAL EVERY 12 HOURS
Qty: 10 TABLET | Refills: 0 | Status: SHIPPED | OUTPATIENT
Start: 2024-03-14 | End: 2024-03-19

## 2024-03-14 RX ORDER — MORPHINE SULFATE 2 MG/ML
6 INJECTION, SOLUTION INTRAMUSCULAR; INTRAVENOUS
Status: COMPLETED | OUTPATIENT
Start: 2024-03-14 | End: 2024-03-14

## 2024-03-14 RX ORDER — DICYCLOMINE HYDROCHLORIDE 10 MG/ML
20 INJECTION INTRAMUSCULAR
Status: DISCONTINUED | OUTPATIENT
Start: 2024-03-14 | End: 2024-03-14

## 2024-03-14 RX ORDER — CIPROFLOXACIN 500 MG/1
500 TABLET ORAL 2 TIMES DAILY
Qty: 10 TABLET | Refills: 0 | Status: SHIPPED | OUTPATIENT
Start: 2024-03-14 | End: 2024-03-19

## 2024-03-14 RX ORDER — ALUMINUM HYDROXIDE, MAGNESIUM HYDROXIDE, AND SIMETHICONE 1200; 120; 1200 MG/30ML; MG/30ML; MG/30ML
30 SUSPENSION ORAL
Status: COMPLETED | OUTPATIENT
Start: 2024-03-14 | End: 2024-03-14

## 2024-03-14 RX ADMIN — ALUMINUM HYDROXIDE, MAGNESIUM HYDROXIDE, AND SIMETHICONE 30 ML: 200; 200; 20 SUSPENSION ORAL at 11:03

## 2024-03-14 RX ADMIN — ONDANSETRON 4 MG: 2 INJECTION INTRAMUSCULAR; INTRAVENOUS at 08:03

## 2024-03-14 RX ADMIN — PANTOPRAZOLE SODIUM 40 MG: 40 INJECTION, POWDER, FOR SOLUTION INTRAVENOUS at 11:03

## 2024-03-14 RX ADMIN — MORPHINE SULFATE 6 MG: 2 INJECTION, SOLUTION INTRAMUSCULAR; INTRAVENOUS at 08:03

## 2024-03-14 NOTE — DISCHARGE INSTRUCTIONS
Take antibiotics as prescribed  bowel rest, or small amounts of food intake, advised to avoid spicy foods, stop tobacco/alcohol use.

## 2024-03-14 NOTE — ED PROVIDER NOTES
Encounter Date: 3/14/2024       History     Chief Complaint   Patient presents with    Abdominal Pain     Patient states having left sided abdominal pain and diarrhea for the past 3 weeks.       43-year-old male with a history of asthma, kidney stones requiring lithotripsy, stomach ulcer.  He presents to ED with complaints of 3 three-week history of epigastric pain with radiation to left flank pain similar to previous kidney stone flare-up. He also tells me he has had intermittent nonbloody watery diarrhea for the past 3 weeks.  No recent or current antibiotic use.  No abdominal surgeries.  He drinks alcohol occasionally, he has a 2 pack a day cigarette smoker.    The history is provided by the patient. No  was used.     Review of patient's allergies indicates:   Allergen Reactions    Ketorolac Other (See Comments) and Palpitations     Chest pain      Past Medical History:   Diagnosis Date    Asthma     Blood transfusion, without reported diagnosis     Kidney stones     Neurofibromatosis     Stomach ulcer      Past Surgical History:   Procedure Laterality Date    LITHOTRIPSY Left     tumor removed from left arm       Family History   Problem Relation Age of Onset    Heart disease Mother     Hypertension Mother     Heart disease Father     Hypertension Father     No Known Problems Brother     No Known Problems Son      Social History     Tobacco Use    Smoking status: Every Day     Current packs/day: 0.50     Types: Cigarettes    Smokeless tobacco: Never   Substance Use Topics    Alcohol use: Yes     Alcohol/week: 2.0 standard drinks of alcohol     Types: 2 Standard drinks or equivalent per week     Comment: hx of liquor daily.     Drug use: No     Review of Systems   Constitutional: Negative.    HENT: Negative.     Eyes: Negative.    Respiratory: Negative.     Cardiovascular: Negative.    Gastrointestinal:  Positive for abdominal pain and diarrhea.   Genitourinary: Negative.    Musculoskeletal:  Negative.    Skin: Negative.    Neurological: Negative.    Hematological: Negative.    Psychiatric/Behavioral: Negative.         Physical Exam     Initial Vitals [03/14/24 0724]   BP Pulse Resp Temp SpO2   (!) 184/98 78 16 98 °F (36.7 °C) 97 %      MAP       --         Physical Exam    Nursing note and vitals reviewed.  Constitutional: He appears well-developed.   HENT:   Head: Normocephalic.   Eyes: EOM are normal. Pupils are equal, round, and reactive to light.   Neck:   Normal range of motion.  Cardiovascular:  Normal rate.           Pulmonary/Chest: Breath sounds normal.   Abdominal: Abdomen is soft. Bowel sounds are normal. There is abdominal tenderness (Left flank). There is rebound. There is no guarding.   Musculoskeletal:      Cervical back: Normal range of motion.     Neurological: He is alert and oriented to person, place, and time. GCS score is 15. GCS eye subscore is 4. GCS verbal subscore is 5. GCS motor subscore is 6.   Skin: Skin is warm and dry. Capillary refill takes less than 2 seconds.   Psychiatric: He has a normal mood and affect.         ED Course   Procedures  Labs Reviewed   CBC W/ AUTO DIFFERENTIAL - Abnormal; Notable for the following components:       Result Value    MCH 31.4 (*)     All other components within normal limits    Narrative:     Release to patient->Immediate   COMPREHENSIVE METABOLIC PANEL - Abnormal; Notable for the following components:    CO2 20 (*)     Glucose 143 (*)     ALT 68 (*)     All other components within normal limits    Narrative:     Release to patient->Immediate   LIPASE - Abnormal; Notable for the following components:    Lipase 242 (*)     All other components within normal limits    Narrative:     Release to patient->Immediate   INFLUENZA A & B BY MOLECULAR   URINALYSIS, REFLEX TO URINE CULTURE    Narrative:     Preferred Collection Type->Urine, Clean Catch  Specimen Source->Urine   SARS-COV-2 RNA AMPLIFICATION, QUAL    Narrative:     Is the patient  symptomatic?->Yes   HIV 1 / 2 ANTIBODY   HEPATITIS C ANTIBODY          Imaging Results              CT Abdomen Pelvis  Without Contrast (Final result)  Result time 03/14/24 08:54:10      Final result by Latonia Yusuf MD (03/14/24 08:54:10)                   Impression:      No renal or ureteral calculi.    Question enteritis involving loops of jejunum in the left mid abdomen, producing focal ileus.  An early or partial small bowel obstruction is a less likely differential possibility.      Electronically signed by: Latonia Yusuf  Date:    03/14/2024  Time:    08:54               Narrative:    EXAMINATION:  CT ABDOMEN PELVIS WITHOUT CONTRAST    CLINICAL HISTORY:  Flank pain, kidney stone suspected;    TECHNIQUE:  Low dose axial images, sagittal and coronal reformations were obtained from the lung bases to the pubic symphysis.  Neither oral nor IV contrast was administered    COMPARISON:  03/11/2020    FINDINGS:  Bronchial wall thickening and mucous plugging noted in the lower lobes.    The liver, gallbladder, spleen, adrenal glands, pancreas are normal.    There are no renal or ureteral calculi, and no hydronephrosis is present.    The urinary bladder is unremarkable.  The prostate is not enlarged.    There are fluid-filled small bowel loops with diameters at the upper limits of normal in the left mid abdomen.  Mild bowel wall thickening is questioned in some of these bowel loops.  There is no focal transition in caliber noted.    There is no free fluid or free air.  The aorta is normal in caliber with scattered calcific plaque.  A 12 mm short axis diameter portacaval lymph node on series 2, image 37 is stable since 2020 and therefore likely benign.                                       Medications   sodium chloride 0.9% bolus 1,000 mL 1,000 mL (0 mLs Intravenous Stopped 3/14/24 0821)   morphine injection 6 mg (6 mg Intravenous Given 3/14/24 0818)   ondansetron injection 4 mg (4 mg Intravenous Given  3/14/24 0818)   pantoprazole injection 40 mg (40 mg Intravenous Given 3/14/24 1121)   aluminum-magnesium hydroxide-simethicone 200-200-20 mg/5 mL suspension 30 mL (30 mLs Oral Given 3/14/24 1123)     Medical Decision Making  43-year-old male epigastric pain, left flank pain, three-week history of intermittent diarrhea.  Differential includes gastroenteritis, PUD/gastritis, kidney stones, UTI, pyelonephritis, mass, others  Lab work shows borderline bicarb, mildly elevated lipase otherwise rest of CMP, CBC, Mag, UA are all without significant gross abnormalities. COVID negative, flu negative.  CT shows questionable enteritis in left mid abdomen causing an ileus otherwise no evidence of bowel obstruction.  He has had diarrhea for 3 weeks iliopsoas unlikely.  Will treat for enteritis with Cipro and Flagyl.  Advised bowel rest, or small amounts of food intake, advised to avoid spicy foods, stop tobacco/alcohol use. Rx omeprazole.  Patient was instructed to follow up with Gastroenterology and was given strict return precautions to the ED. The patient voiced understanding and agreed with the plan      Amount and/or Complexity of Data Reviewed  External Data Reviewed: labs and radiology.  Labs: ordered.  Radiology: ordered.    Risk  OTC drugs.  Prescription drug management.                                      Clinical Impression:  Final diagnoses:  [R19.7] Diarrhea, unspecified type  [R10.13] Epigastric pain (Primary)  [K52.9] Enteritis          ED Disposition Condition    Discharge Stable          ED Prescriptions       Medication Sig Dispense Start Date End Date Auth. Provider    ciprofloxacin HCl (CIPRO) 500 MG tablet Take 1 tablet (500 mg total) by mouth 2 (two) times daily. for 5 days 10 tablet 3/14/2024 3/19/2024 Armond Mackay MD    metroNIDAZOLE (FLAGYL) 500 MG tablet Take 1 tablet (500 mg total) by mouth every 12 (twelve) hours. for 5 days 10 tablet 3/14/2024 3/19/2024 Armond Mackay MD          Follow-up  Information       Follow up With Specialties Details Why Contact Info    [facility] [place] Emergency Dept Emergency Medicine  As needed [address] [facility] [address] [phone]             Armond Mackay MD  03/14/24 [id number]

## 2024-03-15 NOTE — PROGRESS NOTES
Made contact with patient. History of Hep C 4 years ago. He did not seek treatment, No $$. Agreeable for [facility] to set up treatment plan thru [facility].

## 2024-03-19 NOTE — PROGRESS NOTES
"Subjective:       Patient ID: Sandeep Hansen Jr. is a 43 y.o. male Body mass index is 33.9 kg/m².    Chief Complaint: Abdominal Pain    This patient is new to me.  Referring Provider: Dr. Armond Mackay for abdominal pain.     Lipase - 242 (4x upper limit of normal) = acute pancreatitis    Takes cratum - herbal "pain med"    Abdominal Pain  This is a chronic problem. The current episode started more than 1 month ago. The problem occurs constantly. The problem has been gradually worsening. The pain is located in the LUQ. Pain scale: right now 8/10 at worst 10/10 - avg 4-5/10. The quality of the pain is sharp (pressure). Associated symptoms include diarrhea and weight loss. Pertinent negatives include no constipation, fever, nausea or vomiting. The pain is aggravated by certain positions, movement and palpation. The pain is relieved by Nothing. He has tried nothing for the symptoms. The treatment provided no relief. Prior diagnostic workup includes GI consult.     Review of Systems   Constitutional:  Positive for appetite change, unexpected weight change and weight loss. Negative for activity change, chills, fatigue and fever.   HENT:  Negative for sore throat and trouble swallowing.    Respiratory:  Negative for cough and shortness of breath.    Cardiovascular:  Negative for chest pain.   Gastrointestinal:  Positive for abdominal pain and diarrhea. Negative for abdominal distention, anal bleeding, blood in stool, constipation, nausea, rectal pain and vomiting.       No LMP for male patient.  Past Medical History:   Diagnosis Date    Asthma     Blood transfusion, without reported diagnosis     Kidney stones     Neurofibromatosis     Stomach ulcer      Past Surgical History:   Procedure Laterality Date    COLONOSCOPY      LITHOTRIPSY Left     tumor removed from left arm       Family History   Problem Relation Age of Onset    Heart disease Mother     Hypertension Mother     Heart disease Father     Hypertension " Father     No Known Problems Brother     No Known Problems Son     Colon cancer Neg Hx     Colon polyps Neg Hx     Esophageal cancer Neg Hx     Rectal cancer Neg Hx     Stomach cancer Neg Hx      Social History     Tobacco Use    Smoking status: Every Day     Current packs/day: 0.50     Types: Cigarettes    Smokeless tobacco: Never   Substance Use Topics    Alcohol use: Yes     Alcohol/week: 2.0 standard drinks of alcohol     Types: 2 Standard drinks or equivalent per week     Comment: hx of liquor daily.     Drug use: No     Wt Readings from Last 10 Encounters:   03/25/24 92.4 kg (203 lb 11.3 oz)   03/14/24 90.7 kg (200 lb)   01/24/23 95.3 kg (210 lb)   04/04/22 90.7 kg (200 lb)   12/17/21 99.8 kg (220 lb)   10/03/20 95.3 kg (210 lb)   03/11/20 95.3 kg (210 lb)   05/29/19 91.6 kg (202 lb)   04/29/19 86.2 kg (190 lb 0.6 oz)   11/26/18 86.2 kg (190 lb)     Lab Results   Component Value Date    WBC 7.75 03/14/2024    HGB 16.2 03/14/2024    HCT 48.8 03/14/2024    MCV 95 03/14/2024     03/14/2024     CMP  Sodium   Date Value Ref Range Status   03/14/2024 140 136 - 145 mmol/L Final     Potassium   Date Value Ref Range Status   03/14/2024 3.8 3.5 - 5.1 mmol/L Final     Chloride   Date Value Ref Range Status   03/14/2024 108 95 - 110 mmol/L Final     CO2   Date Value Ref Range Status   03/14/2024 20 (L) 23 - 29 mmol/L Final     Glucose   Date Value Ref Range Status   03/14/2024 143 (H) 70 - 110 mg/dL Final     BUN   Date Value Ref Range Status   03/14/2024 11 6 - 20 mg/dL Final     Creatinine   Date Value Ref Range Status   03/14/2024 1.0 0.5 - 1.4 mg/dL Final   07/27/2012 1.1 0.2 - 1.4 mg/dl Final     Calcium   Date Value Ref Range Status   03/14/2024 9.3 8.7 - 10.5 mg/dL Final   07/27/2012 8.8 8.6 - 10.2 mg/dl Final     Total Protein   Date Value Ref Range Status   03/14/2024 7.6 6.0 - 8.4 g/dL Final     Albumin   Date Value Ref Range Status   03/14/2024 4.0 3.5 - 5.2 g/dL Final     Total Bilirubin   Date Value  "Ref Range Status   03/14/2024 0.6 0.1 - 1.0 mg/dL Final     Comment:     For infants and newborns, interpretation of results should be based  on gestational age, weight and in agreement with clinical  observations.    Premature Infant recommended reference ranges:  Up to 24 hours.............<8.0 mg/dL  Up to 48 hours............<12.0 mg/dL  3-5 days..................<15.0 mg/dL  6-29 days.................<15.0 mg/dL       Alkaline Phosphatase   Date Value Ref Range Status   03/14/2024 96 55 - 135 U/L Final   07/27/2012 77 23 - 119 UNIT/L Final     AST   Date Value Ref Range Status   03/14/2024 40 10 - 40 U/L Final   07/27/2012 17 10 - 34 UNIT/L Final     ALT   Date Value Ref Range Status   03/14/2024 68 (H) 10 - 44 U/L Final     Anion Gap   Date Value Ref Range Status   03/14/2024 12 8 - 16 mmol/L Final   07/27/2012 9 5 - 15 meq/L Final     eGFR if    Date Value Ref Range Status   04/04/2022 >60.0 >60 mL/min/1.73 m^2 Final     eGFR if non    Date Value Ref Range Status   04/04/2022 >60.0 >60 mL/min/1.73 m^2 Final     Comment:     Calculation used to obtain the estimated glomerular filtration  rate (eGFR) is the CKD-EPI equation.        No results found for: "AMYLASE"  Lab Results   Component Value Date    LIPASE 242 (H) 03/14/2024     No results found for: "LIPASERES"  Lab Results   Component Value Date    TSH 2.124 06/27/2019       Reviewed prior medical records including radiology report of ultrasound abdominal complete 04/01/2019, CT abdomen pelvis without contrast 03/11/2020, x-ray chest 10/03/2020, ultrasound abdominal complete 02/24/2021, CT abdomen pelvis 03/14/2024 & endoscopy history (see surgical history).    Objective:      Physical Exam  Vitals and nursing note reviewed.   Constitutional:       General: He is not in acute distress.     Appearance: He is not ill-appearing.   HENT:      Head: Normocephalic and atraumatic.      Mouth/Throat:      Mouth: Mucous membranes are " moist.      Pharynx: Oropharynx is clear.   Eyes:      Conjunctiva/sclera: Conjunctivae normal.   Cardiovascular:      Rate and Rhythm: Normal rate and regular rhythm.      Pulses: Normal pulses.      Heart sounds: Normal heart sounds.   Pulmonary:      Effort: Pulmonary effort is normal. No respiratory distress.      Breath sounds: Normal breath sounds.   Abdominal:      General: Bowel sounds are normal. There is no distension.      Palpations: Abdomen is soft.      Tenderness: There is abdominal tenderness.   Skin:     General: Skin is warm and dry.      Capillary Refill: Capillary refill takes 2 to 3 seconds.   Neurological:      Mental Status: He is alert and oriented to person, place, and time.         Assessment:       1. Left upper quadrant abdominal pain    2. Abdominal pain, unspecified abdominal location    3. Change in bowel habits    4. Diarrhea of presumed infectious origin    5. History of long term use of antibiotics    6. Fatigue, unspecified type    7. History of hepatitis C    8. Elevated lipase    9. Epigastric pain        Plan:       Left upper quadrant abdominal pain, Abdominal pain, unspecified abdominal location  - schedule Colonoscopy, discussed procedure with the patient, including risks and benefits, patient verbalized understanding   - Consider Bentyl if C. Diff negative   -     Giardia / Cryptosporidum, EIA; Future; Expected date: 03/25/2024  -     Stool Exam-Ova,Cysts,Parasites; Future; Expected date: 03/25/2024  -     Clostridium difficile EIA; Future; Expected date: 03/25/2024  -     Stool culture; Future; Expected date: 03/25/2024  -     Case Request Endoscopy: COLONOSCOPY    Change in bowel habits, Diarrhea of presumed infectious origin, History of long term use of antibiotics  - schedule Colonoscopy, discussed procedure with the patient, including risks and benefits, patient verbalized understanding   - Patient with history of osteomyelitis with long-term need for antibiotic therapy  which likely is contributing to this change in bowel habits and diarrhea  - Consider Bentyl if C. Diff negative   -     Case Request Endoscopy: COLONOSCOPY  -     Giardia / Cryptosporidum, EIA; Future; Expected date: 03/25/2024  -     Stool Exam-Ova,Cysts,Parasites; Future; Expected date: 03/25/2024  -     Clostridium difficile EIA; Future; Expected date: 03/25/2024  -     Stool culture; Future; Expected date: 03/25/2024    Fatigue, unspecified type  - patient reports no unusual change in energy level.  - he does report he has a history of hep C which has not been treated yet  - we will do iron studies to rule out iron-deficiency anemia  -     IRON AND TIBC; Future; Expected date: 03/25/2024  -     Ferritin; Future; Expected date: 03/25/2024    History of hepatitis C  - patient reports he has an appointment to see hepatology next month  - discussed with patient that hepatology is the best specialty for him to have treatment and that he should pursue treatment  -     Comprehensive Metabolic Panel; Future; Expected date: 03/25/2024    Elevated lipase, Epigastric pain  - lipase was elevated on 03/14 we will repeat today to ensure it has trended down  - if lipase is still elevated will inform patient of pancreatitis diet that he should follow until pain resolves  - if elevated will repeat again in 2-4 weeks  -     Lipase; Future; Expected date: 03/25/2024  -     AMYLASE; Future; Expected date: 03/25/2024    Follow up in about 4 weeks (around 4/22/2024), or if symptoms worsen or fail to improve.      If no improvement in symptoms or symptoms worsen, call/follow-up at clinic or go to ER.       LEO Santoro, RODNEYP-C    Encounter includes face to face time and non-face to face time preparing to see the patient (eg, review of tests), obtaining and/or reviewing separately obtained history, documenting clinical information in the electronic or other health record, independently interpreting results (not separately  reported) and communicating results to the patient/family/caregiver, or care coordination (not separately reported).     A dictation software program was used for this note. Please expect some simple typographical errors in this note.

## 2024-03-21 ENCOUNTER — TELEPHONE (OUTPATIENT)
Dept: HEPATOLOGY | Facility: CLINIC | Age: 44
End: 2024-03-21
Payer: COMMERCIAL

## 2024-03-21 DIAGNOSIS — B19.20 HEPATITIS C VIRUS INFECTION WITHOUT HEPATIC COMA, UNSPECIFIED CHRONICITY: Primary | ICD-10-CM

## 2024-03-21 NOTE — PROGRESS NOTES
Attempted to call patient to link to care. No answer. Voicemail is disable and unable to leave a message.

## 2024-03-21 NOTE — TELEPHONE ENCOUNTER
Dr. John Wylie ordered that patient be scheduled for a hepatology consult visit for hep c.  Patient hep c quant positive.  I spoke with patient.  Appt with PA Scheuermann scheduled 5/21/24; reminder notice mailed.

## 2024-03-25 ENCOUNTER — OFFICE VISIT (OUTPATIENT)
Dept: GASTROENTEROLOGY | Facility: CLINIC | Age: 44
End: 2024-03-25
Payer: COMMERCIAL

## 2024-03-25 ENCOUNTER — LAB VISIT (OUTPATIENT)
Dept: LAB | Facility: HOSPITAL | Age: 44
End: 2024-03-25
Payer: COMMERCIAL

## 2024-03-25 ENCOUNTER — PATIENT MESSAGE (OUTPATIENT)
Dept: GASTROENTEROLOGY | Facility: CLINIC | Age: 44
End: 2024-03-25

## 2024-03-25 VITALS
BODY MASS INDEX: 33.94 KG/M2 | SYSTOLIC BLOOD PRESSURE: 146 MMHG | HEIGHT: 65 IN | HEART RATE: 89 BPM | DIASTOLIC BLOOD PRESSURE: 75 MMHG | WEIGHT: 203.69 LBS

## 2024-03-25 DIAGNOSIS — R10.13 EPIGASTRIC PAIN: ICD-10-CM

## 2024-03-25 DIAGNOSIS — R19.7 DIARRHEA OF PRESUMED INFECTIOUS ORIGIN: ICD-10-CM

## 2024-03-25 DIAGNOSIS — R53.83 FATIGUE, UNSPECIFIED TYPE: ICD-10-CM

## 2024-03-25 DIAGNOSIS — R10.12 LEFT UPPER QUADRANT ABDOMINAL PAIN: Primary | ICD-10-CM

## 2024-03-25 DIAGNOSIS — R10.12 LEFT UPPER QUADRANT ABDOMINAL PAIN: ICD-10-CM

## 2024-03-25 DIAGNOSIS — Z86.19 HISTORY OF HEPATITIS C: ICD-10-CM

## 2024-03-25 DIAGNOSIS — R10.9 ABDOMINAL PAIN, UNSPECIFIED ABDOMINAL LOCATION: ICD-10-CM

## 2024-03-25 DIAGNOSIS — R74.8 ELEVATED LIPASE: ICD-10-CM

## 2024-03-25 DIAGNOSIS — Z92.29 HISTORY OF LONG TERM USE OF ANTIBIOTICS: ICD-10-CM

## 2024-03-25 DIAGNOSIS — R19.4 CHANGE IN BOWEL HABITS: ICD-10-CM

## 2024-03-25 LAB
ALBUMIN SERPL BCP-MCNC: 3.8 G/DL (ref 3.5–5.2)
ALP SERPL-CCNC: 104 U/L (ref 55–135)
ALT SERPL W/O P-5'-P-CCNC: 183 U/L (ref 10–44)
AMYLASE SERPL-CCNC: 129 U/L (ref 20–110)
ANION GAP SERPL CALC-SCNC: 8 MMOL/L (ref 8–16)
AST SERPL-CCNC: 76 U/L (ref 10–40)
BILIRUB SERPL-MCNC: 0.4 MG/DL (ref 0.1–1)
BUN SERPL-MCNC: 11 MG/DL (ref 6–20)
CALCIUM SERPL-MCNC: 9.2 MG/DL (ref 8.7–10.5)
CHLORIDE SERPL-SCNC: 107 MMOL/L (ref 95–110)
CO2 SERPL-SCNC: 24 MMOL/L (ref 23–29)
CREAT SERPL-MCNC: 0.9 MG/DL (ref 0.5–1.4)
EST. GFR  (NO RACE VARIABLE): >60 ML/MIN/1.73 M^2
FERRITIN SERPL-MCNC: 143 NG/ML (ref 20–300)
GLUCOSE SERPL-MCNC: 99 MG/DL (ref 70–110)
IRON SERPL-MCNC: 55 UG/DL (ref 45–160)
LIPASE SERPL-CCNC: 196 U/L (ref 4–60)
POTASSIUM SERPL-SCNC: 4.4 MMOL/L (ref 3.5–5.1)
PROT SERPL-MCNC: 7.3 G/DL (ref 6–8.4)
SATURATED IRON: 16 % (ref 20–50)
SODIUM SERPL-SCNC: 139 MMOL/L (ref 136–145)
TOTAL IRON BINDING CAPACITY: 343 UG/DL (ref 250–450)
TRANSFERRIN SERPL-MCNC: 245 MG/DL (ref 200–375)

## 2024-03-25 PROCEDURE — 82728 ASSAY OF FERRITIN: CPT

## 2024-03-25 PROCEDURE — 36415 COLL VENOUS BLD VENIPUNCTURE: CPT

## 2024-03-25 PROCEDURE — 1160F RVW MEDS BY RX/DR IN RCRD: CPT | Mod: CPTII,S$GLB,,

## 2024-03-25 PROCEDURE — 82150 ASSAY OF AMYLASE: CPT

## 2024-03-25 PROCEDURE — 3078F DIAST BP <80 MM HG: CPT | Mod: CPTII,S$GLB,,

## 2024-03-25 PROCEDURE — 1159F MED LIST DOCD IN RCRD: CPT | Mod: CPTII,S$GLB,,

## 2024-03-25 PROCEDURE — 99204 OFFICE O/P NEW MOD 45 MIN: CPT | Mod: S$GLB,,,

## 2024-03-25 PROCEDURE — 99999 PR PBB SHADOW E&M-EST. PATIENT-LVL III: CPT | Mod: PBBFAC,,,

## 2024-03-25 PROCEDURE — 80053 COMPREHEN METABOLIC PANEL: CPT

## 2024-03-25 PROCEDURE — 83540 ASSAY OF IRON: CPT

## 2024-03-25 PROCEDURE — 3008F BODY MASS INDEX DOCD: CPT | Mod: CPTII,S$GLB,,

## 2024-03-25 PROCEDURE — 3077F SYST BP >= 140 MM HG: CPT | Mod: CPTII,S$GLB,,

## 2024-03-25 PROCEDURE — 83690 ASSAY OF LIPASE: CPT

## 2024-04-04 ENCOUNTER — TELEPHONE (OUTPATIENT)
Dept: GASTROENTEROLOGY | Facility: CLINIC | Age: 44
End: 2024-04-04
Payer: COMMERCIAL

## 2024-04-04 NOTE — TELEPHONE ENCOUNTER
----- Message from Mirna Fenton MA sent at 4/4/2024  1:36 PM CDT -----  Regarding: polo  Spoke with patient.  He forgot about his appointment today with .   He would like to reschedule.    Zee

## 2024-04-08 ENCOUNTER — HOSPITAL ENCOUNTER (OUTPATIENT)
Dept: RADIOLOGY | Facility: HOSPITAL | Age: 44
Discharge: HOME | End: 2024-04-08
Payer: COMMERCIAL

## 2024-04-08 ENCOUNTER — TELEPHONE (OUTPATIENT)
Dept: ENDOSCOPY | Facility: HOSPITAL | Age: 44
End: 2024-04-08
Payer: COMMERCIAL

## 2024-04-08 DIAGNOSIS — R10.12 LEFT UPPER QUADRANT ABDOMINAL PAIN: ICD-10-CM

## 2024-04-08 DIAGNOSIS — R74.8 ELEVATED LIPASE: ICD-10-CM

## 2024-04-08 PROCEDURE — 76700 US EXAM ABDOM COMPLETE: CPT | Mod: 26,,, | Performed by: RADIOLOGY

## 2024-04-08 PROCEDURE — 76700 US EXAM ABDOM COMPLETE: CPT | Mod: TC

## 2024-04-08 NOTE — TELEPHONE ENCOUNTER
Received message pt missed clinic appt related to elevated lipase with AES. Pt already scheduled with general gi in clinic for same diagnosis. Will await general gi recommendation if AES is needed.

## 2024-04-11 ENCOUNTER — TELEPHONE (OUTPATIENT)
Dept: GASTROENTEROLOGY | Facility: CLINIC | Age: 44
End: 2024-04-11
Payer: COMMERCIAL

## 2024-04-11 NOTE — TELEPHONE ENCOUNTER
Called patient per Esther eric request, patient need to see advanced GI for elevated Lipase levels. No further issues noted.

## 2024-04-12 ENCOUNTER — PATIENT MESSAGE (OUTPATIENT)
Dept: ENDOSCOPY | Facility: HOSPITAL | Age: 44
End: 2024-04-12
Payer: COMMERCIAL

## 2024-04-15 ENCOUNTER — HOSPITAL ENCOUNTER (EMERGENCY)
Facility: HOSPITAL | Age: 44
Discharge: HOME | End: 2024-04-15
Attending: EMERGENCY MEDICINE
Payer: COMMERCIAL

## 2024-04-15 VITALS
OXYGEN SATURATION: 97 % | HEART RATE: 84 BPM | WEIGHT: 200 LBS | SYSTOLIC BLOOD PRESSURE: 131 MMHG | HEIGHT: 65 IN | RESPIRATION RATE: 16 BRPM | DIASTOLIC BLOOD PRESSURE: 65 MMHG | TEMPERATURE: 98 F | BODY MASS INDEX: 33.32 KG/M2

## 2024-04-15 DIAGNOSIS — R10.12 LUQ ABDOMINAL PAIN: ICD-10-CM

## 2024-04-15 DIAGNOSIS — R19.8 PEPTIC ULCER SYMPTOMS: Primary | ICD-10-CM

## 2024-04-15 LAB
ALBUMIN SERPL BCP-MCNC: 3.9 G/DL (ref 3.5–5.2)
ALP SERPL-CCNC: 101 U/L (ref 55–135)
ALT SERPL W/O P-5'-P-CCNC: 34 U/L (ref 10–44)
ANION GAP SERPL CALC-SCNC: 12 MMOL/L (ref 8–16)
AST SERPL-CCNC: 21 U/L (ref 10–40)
BASOPHILS # BLD AUTO: 0.05 K/UL (ref 0–0.2)
BASOPHILS NFR BLD: 0.6 % (ref 0–1.9)
BILIRUB SERPL-MCNC: 0.6 MG/DL (ref 0.1–1)
BILIRUB UR QL STRIP: NEGATIVE
BUN SERPL-MCNC: 12 MG/DL (ref 6–20)
CALCIUM SERPL-MCNC: 9.7 MG/DL (ref 8.7–10.5)
CHLORIDE SERPL-SCNC: 107 MMOL/L (ref 95–110)
CLARITY UR: CLEAR
CO2 SERPL-SCNC: 21 MMOL/L (ref 23–29)
COLOR UR: YELLOW
CREAT SERPL-MCNC: 1.2 MG/DL (ref 0.5–1.4)
DIFFERENTIAL METHOD BLD: ABNORMAL
EOSINOPHIL # BLD AUTO: 0.1 K/UL (ref 0–0.5)
EOSINOPHIL NFR BLD: 1.2 % (ref 0–8)
ERYTHROCYTE [DISTWIDTH] IN BLOOD BY AUTOMATED COUNT: 12.7 % (ref 11.5–14.5)
EST. GFR  (NO RACE VARIABLE): >60 ML/MIN/1.73 M^2
GLUCOSE SERPL-MCNC: 104 MG/DL (ref 70–110)
GLUCOSE UR QL STRIP: NEGATIVE
HCT VFR BLD AUTO: 45.7 % (ref 40–54)
HGB BLD-MCNC: 15.2 G/DL (ref 14–18)
HGB UR QL STRIP: NEGATIVE
IMM GRANULOCYTES # BLD AUTO: 0.06 K/UL (ref 0–0.04)
IMM GRANULOCYTES NFR BLD AUTO: 0.7 % (ref 0–0.5)
KETONES UR QL STRIP: ABNORMAL
LEUKOCYTE ESTERASE UR QL STRIP: NEGATIVE
LIPASE SERPL-CCNC: 10 U/L (ref 4–60)
LYMPHOCYTES # BLD AUTO: 1.6 K/UL (ref 1–4.8)
LYMPHOCYTES NFR BLD: 19.1 % (ref 18–48)
MCH RBC QN AUTO: 31.3 PG (ref 27–31)
MCHC RBC AUTO-ENTMCNC: 33.3 G/DL (ref 32–36)
MCV RBC AUTO: 94 FL (ref 82–98)
MONOCYTES # BLD AUTO: 0.7 K/UL (ref 0.3–1)
MONOCYTES NFR BLD: 8.8 % (ref 4–15)
NEUTROPHILS # BLD AUTO: 5.9 K/UL (ref 1.8–7.7)
NEUTROPHILS NFR BLD: 69.6 % (ref 38–73)
NITRITE UR QL STRIP: NEGATIVE
NRBC BLD-RTO: 0 /100 WBC
PH UR STRIP: 6 [PH] (ref 5–8)
PLATELET # BLD AUTO: 256 K/UL (ref 150–450)
PMV BLD AUTO: 10.4 FL (ref 9.2–12.9)
POTASSIUM SERPL-SCNC: 4 MMOL/L (ref 3.5–5.1)
PROT SERPL-MCNC: 7.7 G/DL (ref 6–8.4)
PROT UR QL STRIP: NEGATIVE
RBC # BLD AUTO: 4.85 M/UL (ref 4.6–6.2)
SODIUM SERPL-SCNC: 140 MMOL/L (ref 136–145)
SP GR UR STRIP: >=1.03 (ref 1–1.03)
URN SPEC COLLECT METH UR: ABNORMAL
UROBILINOGEN UR STRIP-ACNC: NEGATIVE EU/DL
WBC # BLD AUTO: 8.44 K/UL (ref 3.9–12.7)

## 2024-04-15 PROCEDURE — 99284 EMERGENCY DEPT VISIT MOD MDM: CPT | Mod: 25

## 2024-04-15 PROCEDURE — 63600175 PHARM REV CODE 636 W HCPCS: Performed by: EMERGENCY MEDICINE

## 2024-04-15 PROCEDURE — 80053 COMPREHEN METABOLIC PANEL: CPT | Performed by: EMERGENCY MEDICINE

## 2024-04-15 PROCEDURE — 96375 TX/PRO/DX INJ NEW DRUG ADDON: CPT

## 2024-04-15 PROCEDURE — 96374 THER/PROPH/DIAG INJ IV PUSH: CPT

## 2024-04-15 PROCEDURE — 81003 URINALYSIS AUTO W/O SCOPE: CPT | Performed by: EMERGENCY MEDICINE

## 2024-04-15 PROCEDURE — 85025 COMPLETE CBC W/AUTO DIFF WBC: CPT | Performed by: EMERGENCY MEDICINE

## 2024-04-15 PROCEDURE — 25000003 PHARM REV CODE 250: Performed by: EMERGENCY MEDICINE

## 2024-04-15 PROCEDURE — C9113 INJ PANTOPRAZOLE SODIUM, VIA: HCPCS | Performed by: EMERGENCY MEDICINE

## 2024-04-15 PROCEDURE — 83690 ASSAY OF LIPASE: CPT | Performed by: EMERGENCY MEDICINE

## 2024-04-15 RX ORDER — PANTOPRAZOLE SODIUM 40 MG/10ML
40 INJECTION, POWDER, LYOPHILIZED, FOR SOLUTION INTRAVENOUS
Status: COMPLETED | OUTPATIENT
Start: 2024-04-15 | End: 2024-04-15

## 2024-04-15 RX ORDER — ALUMINUM HYDROXIDE, MAGNESIUM HYDROXIDE, AND SIMETHICONE 1200; 120; 1200 MG/30ML; MG/30ML; MG/30ML
30 SUSPENSION ORAL
Status: COMPLETED | OUTPATIENT
Start: 2024-04-15 | End: 2024-04-15

## 2024-04-15 RX ORDER — PANTOPRAZOLE SODIUM 40 MG/1
40 TABLET, DELAYED RELEASE ORAL DAILY
Qty: 30 TABLET | Refills: 0 | Status: SHIPPED | OUTPATIENT
Start: 2024-04-15 | End: 2024-05-14

## 2024-04-15 RX ORDER — HYDROMORPHONE HYDROCHLORIDE 1 MG/ML
1 INJECTION, SOLUTION INTRAMUSCULAR; INTRAVENOUS; SUBCUTANEOUS
Status: COMPLETED | OUTPATIENT
Start: 2024-04-15 | End: 2024-04-15

## 2024-04-15 RX ORDER — LIDOCAINE HYDROCHLORIDE 20 MG/ML
15 SOLUTION OROPHARYNGEAL
Status: COMPLETED | OUTPATIENT
Start: 2024-04-15 | End: 2024-04-15

## 2024-04-15 RX ORDER — SUCRALFATE 1 G/10ML
1 SUSPENSION ORAL 4 TIMES DAILY
Qty: 1200 ML | Refills: 0 | Status: SHIPPED | OUTPATIENT
Start: 2024-04-15 | End: 2024-05-15

## 2024-04-15 RX ORDER — ONDANSETRON HYDROCHLORIDE 2 MG/ML
4 INJECTION, SOLUTION INTRAVENOUS
Status: COMPLETED | OUTPATIENT
Start: 2024-04-15 | End: 2024-04-15

## 2024-04-15 RX ORDER — OXYCODONE AND ACETAMINOPHEN 5; 325 MG/1; MG/1
1-2 TABLET ORAL EVERY 4 HOURS PRN
Qty: 18 TABLET | Refills: 0 | Status: SHIPPED | OUTPATIENT
Start: 2024-04-15 | End: 2024-04-22

## 2024-04-15 RX ADMIN — ALUMINUM HYDROXIDE, MAGNESIUM HYDROXIDE, AND SIMETHICONE 30 ML: 1200; 120; 1200 SUSPENSION ORAL at 06:04

## 2024-04-15 RX ADMIN — HYDROMORPHONE HYDROCHLORIDE 1 MG: 1 INJECTION, SOLUTION INTRAMUSCULAR; INTRAVENOUS; SUBCUTANEOUS at 06:04

## 2024-04-15 RX ADMIN — LIDOCAINE HYDROCHLORIDE 15 ML: 20 SOLUTION ORAL at 06:04

## 2024-04-15 RX ADMIN — ONDANSETRON 4 MG: 2 INJECTION INTRAMUSCULAR; INTRAVENOUS at 06:04

## 2024-04-15 RX ADMIN — PANTOPRAZOLE SODIUM 40 MG: 40 INJECTION, POWDER, FOR SOLUTION INTRAVENOUS at 06:04

## 2024-04-15 NOTE — ED PROVIDER NOTES
"   History     Chief Complaint   Patient presents with    Abdominal Pain     LUQ pain x "months." Reports hx of pancreatitis.      HPI:  Sandeep Hansen Jr. is a 43 y.o. male with PMH as below including HCV and PUD (history blood transfusions for it) and elevated lipase in the past, who has seen GI in clinic without conclusive follow up for prior results, who presents to the [facility] emergency department for evaluation of severe epigastric and left upper quadrant abdominal pain radiating to the back, not worsened by food intake. Avoids stomach-irritant foods and NSAIDs. He has no other complaints.       An independent history was also taken from the patient's wife due to knowledge of patient's med history, who added: patient does well with avoiding spice, red sauce, etc.    PCP: No, Primary Doctor    Review of patient's allergies indicates:   Allergen Reactions    Ketorolac Other (See Comments) and Palpitations     Chest pain       Past Medical History:   Diagnosis Date    Asthma     Blood transfusion, without reported diagnosis     Kidney stones     Neurofibromatosis     Stomach ulcer      Past Surgical History:   Procedure Laterality Date    COLONOSCOPY      LITHOTRIPSY Left     tumor removed from left arm         Family History   Problem Relation Name Age of Onset    Heart disease Mother      Hypertension Mother      Heart disease Father      Hypertension Father      No Known Problems Brother      No Known Problems Son      Colon cancer Neg Hx      Colon polyps Neg Hx      Esophageal cancer Neg Hx      Rectal cancer Neg Hx      Stomach cancer Neg Hx       Social History     Tobacco Use    Smoking status: Every Day     Current packs/day: 0.50     Types: Cigarettes    Smokeless tobacco: Never   Substance and Sexual Activity    Alcohol use: Yes     Alcohol/week: 2.0 standard drinks of alcohol     Types: 2 Standard drinks or equivalent per week     Comment: hx of liquor daily.     Drug use: No    Sexual " "activity: Yes     Partners: Female     Comment: [email or web address]      Review of Systems     Review of Systems   Constitutional: Negative.  Negative for fever.   HENT: Negative.     Eyes: Negative.    Respiratory: Negative.     Cardiovascular: Negative.    Gastrointestinal:  Positive for abdominal pain. Negative for nausea and vomiting.   Endocrine: Negative.    Genitourinary: Negative.    Musculoskeletal: Negative.    Skin: Negative.    Allergic/Immunologic: Negative.    Neurological: Negative.    Hematological: Negative.    Psychiatric/Behavioral: Negative.     All other systems reviewed and are negative.       Physical Exam     Initial Vitals [04/15/24 1613]   BP Pulse Resp Temp SpO2   (!) 196/101 84 15 98.3 °F (36.8 °C) 97 %      MAP       --          Nursing notes and vital signs reviewed.  Constitutional: Patient is in moderate to severe acute distress.   Head: Normocephalic. Atraumatic.   Eyes:  Conjunctivae are not pale. No scleral icterus.   ENT: Mucous membranes moist.   Neck: Supple.   Cardiovascular: Regular rate. Regular rhythm. No murmurs, rubs, or gallops   Pulmonary: No respiratory distress. Clear to auscultation bilaterally   Abdominal: Soft. Non-distended. Epigastric tenderness and LUQ tenderness. No other tenderness. No rebound tenderness or guarding.   Musculoskeletal: Moves all extremities. No obvious deformities.   Skin: Warm and dry.   Neurological:  Alert, awake, and appropriate. Normal speech. No acute lateralizing neurologic deficits appreciated.   Psychiatric: Normal affect.       ED Course   Procedures  Vitals:    04/15/24 1613 04/15/24 1701 04/15/24 1817   BP: (!) 196/101 131/65    Pulse: 84     Resp: 15  16   Temp: 98.3 °F (36.8 °C)     TempSrc: Oral     SpO2: 97%     Weight: 90.7 kg (200 lb)     Height: 5' 5" (1.651 m)       Lab Results Interpreted as Abnormal:  Labs Reviewed   CBC W/ AUTO DIFFERENTIAL - Abnormal; Notable for the following components:       Result Value    MCH 31.3 (*)     " Immature Granulocytes 0.7 (*)     Immature Grans (Abs) 0.06 (*)     All other components within normal limits   COMPREHENSIVE METABOLIC PANEL - Abnormal; Notable for the following components:    CO2 21 (*)     All other components within normal limits   URINALYSIS, REFLEX TO URINE CULTURE - Abnormal; Notable for the following components:    Specific Gravity, UA >=1.030 (*)     Ketones, UA Trace (*)     All other components within normal limits    Narrative:     Preferred Collection Type->Urine, Clean Catch  Specimen Source->Urine   LIPASE      All Lab Results:  Results for orders placed or performed during the hospital encounter of 04/15/24   CBC auto differential   Result Value Ref Range    WBC 8.44 3.90 - 12.70 K/uL    RBC 4.85 4.60 - 6.20 M/uL    Hemoglobin 15.2 14.0 - 18.0 g/dL    Hematocrit 45.7 40.0 - 54.0 %    MCV 94 82 - 98 fL    MCH 31.3 (H) 27.0 - 31.0 pg    MCHC 33.3 32.0 - 36.0 g/dL    RDW 12.7 11.5 - 14.5 %    Platelets 256 150 - 450 K/uL    MPV 10.4 9.2 - 12.9 fL    Immature Granulocytes 0.7 (H) 0.0 - 0.5 %    Gran # (ANC) 5.9 1.8 - 7.7 K/uL    Immature Grans (Abs) 0.06 (H) 0.00 - 0.04 K/uL    Lymph # 1.6 1.0 - 4.8 K/uL    Mono # 0.7 0.3 - 1.0 K/uL    Eos # 0.1 0.0 - 0.5 K/uL    Baso # 0.05 0.00 - 0.20 K/uL    nRBC 0 0 /100 WBC    Gran % 69.6 38.0 - 73.0 %    Lymph % 19.1 18.0 - 48.0 %    Mono % 8.8 4.0 - 15.0 %    Eosinophil % 1.2 0.0 - 8.0 %    Basophil % 0.6 0.0 - 1.9 %    Differential Method Automated    Comprehensive metabolic panel   Result Value Ref Range    Sodium 140 136 - 145 mmol/L    Potassium 4.0 3.5 - 5.1 mmol/L    Chloride 107 95 - 110 mmol/L    CO2 21 (L) 23 - 29 mmol/L    Glucose 104 70 - 110 mg/dL    BUN 12 6 - 20 mg/dL    Creatinine 1.2 0.5 - 1.4 mg/dL    Calcium 9.7 8.7 - 10.5 mg/dL    Total Protein 7.7 6.0 - 8.4 g/dL    Albumin 3.9 3.5 - 5.2 g/dL    Total Bilirubin 0.6 0.1 - 1.0 mg/dL    Alkaline Phosphatase 101 55 - 135 U/L    AST 21 10 - 40 U/L    ALT 34 10 - 44 U/L    eGFR >60.0  >60 mL/min/1.73 m^2    Anion Gap 12 8 - 16 mmol/L   Lipase   Result Value Ref Range    Lipase 10 4 - 60 U/L   Urinalysis, Reflex to Urine Culture Urine, Clean Catch    Specimen: Urine   Result Value Ref Range    Specimen UA Urine, Unspecified     Color, UA Yellow Yellow, Straw, Vera    Appearance, UA Clear Clear    pH, UA 6.0 5.0 - 8.0    Specific Gravity, UA >=1.030 (A) 1.005 - 1.030    Protein, UA Negative Negative    Glucose, UA Negative Negative    Ketones, UA Trace (A) Negative    Bilirubin (UA) Negative Negative    Occult Blood UA Negative Negative    Nitrite, UA Negative Negative    Urobilinogen, UA Negative Negative EU/dL    Leukocytes, UA Negative Negative     Imaging Results    None        The emergency physician reviewed the vital signs and test results, which are outlined above.     ED Discussion        Symptoms most consistent with PUD; follow up with GI; trial of PPIs. Patient scheduled for scope in June.     Patient's evaluation in the ED does not suggest any emergent or life-threatening medical conditions requiring immediate intervention beyond what was provided in the ED, and I believe patient is safe for discharge. Regardless, an unremarkable evaluation in the ED does not preclude the development or presence of a serious or life-threatening condition. As such, patient was given return instructions for any change or worsening of symptoms.       ED Medication(s) Administered:  Medications   HYDROmorphone injection 1 mg (1 mg Intravenous Given 4/15/24 1817)   ondansetron injection 4 mg (4 mg Intravenous Given 4/15/24 1812)   aluminum-magnesium hydroxide-simethicone 200-200-20 mg/5 mL suspension 30 mL (30 mLs Oral Given 4/15/24 1811)     And   LIDOcaine viscous HCl 2% oral solution 15 mL (15 mLs Oral Given 4/15/24 1811)   pantoprazole injection 40 mg (40 mg Intravenous Given 4/15/24 1815)       Prescription Management: I performed a review of the patient's current Rx medication list as input by  nursing staff.    Discharge Medication List as of 4/15/2024  5:49 PM        START taking these medications    Details   oxyCODONE-acetaminophen (PERCOCET) 5-325 mg per tablet Take 1-2 tablets by mouth every 4 (four) hours as needed for Pain., Starting Mon 4/15/2024, Until Mon 4/22/2024 at 2359, Print      pantoprazole (PROTONIX) 40 MG tablet Take 1 tablet (40 mg total) by mouth once daily., Starting Mon 4/15/2024, Until Wed 5/15/2024, Normal      sucralfate (CARAFATE) 100 mg/mL suspension Take 10 mLs (1 g total) by mouth 4 (four) times daily., Starting Mon 4/15/2024, Until Wed 5/15/2024, Normal           STOP taking these medications       albuterol (PROVENTIL/VENTOLIN HFA) 90 mcg/actuation inhaler Comments:   Reason for Stopping:         ciprofloxacin HCl (CIPRO) 500 MG tablet Comments:   Reason for Stopping:         EScitalopram oxalate (LEXAPRO) 20 MG tablet Comments:   Reason for Stopping:         hydrOXYzine pamoate (VISTARIL) 25 MG Cap Comments:   Reason for Stopping:         traZODone (DESYREL) 150 MG tablet Comments:   Reason for Stopping:                 Follow-up Information       GI clinic. Schedule an appointment as soon as possible for a visit in 1 week.               [facility] Emergency Dept.    Specialty: Emergency Medicine  Why: As needed, If symptoms worsen  Contact information:  [address] [place] [id number] [phone]                          Clinical Impression       ICD-10-CM ICD-9-CM   1. Peptic ulcer symptoms  R19.8 787.99   2. LUQ abdominal pain  R10.12 789.02      ED Disposition Condition    Discharge Stable             Romain Parrish MD  04/16/24 0690

## 2024-04-16 ENCOUNTER — TELEPHONE (OUTPATIENT)
Dept: HEPATOLOGY | Facility: CLINIC | Age: 44
End: 2024-04-16
Payer: COMMERCIAL

## 2024-04-16 NOTE — TELEPHONE ENCOUNTER
Patient was a no-show for scheduled appt with PA Scheuermann on 4/16/24.  Attempt made to reach him X 2 for rescheduling.  I left [facility] and sent a letter asking that he call hepatology.

## 2024-05-13 DIAGNOSIS — R19.8 PEPTIC ULCER SYMPTOMS: ICD-10-CM

## 2024-05-14 RX ORDER — PANTOPRAZOLE SODIUM 40 MG/1
40 TABLET, DELAYED RELEASE ORAL
Qty: 30 TABLET | Refills: 0 | Status: SHIPPED | OUTPATIENT
Start: 2024-05-14 | End: 2024-06-10 | Stop reason: SDUPTHER

## 2024-05-28 ENCOUNTER — TELEPHONE (OUTPATIENT)
Dept: HEPATOLOGY | Facility: CLINIC | Age: 44
End: 2024-05-28
Payer: COMMERCIAL

## 2024-05-28 NOTE — TELEPHONE ENCOUNTER
Patient scheduled for a consult visit with PA Scheuermann on 5/28/24.  I left appt VM reminders on 5/23/24 and 5/27/24.  Patient was a no-show.  Attempt made to reach him again today for rescheduling.  I left a VM and sent a letter asking that he call hepatology.

## 2024-06-10 ENCOUNTER — ANESTHESIA EVENT (OUTPATIENT)
Dept: ENDOSCOPY | Facility: HOSPITAL | Age: 44
End: 2024-06-10
Payer: COMMERCIAL

## 2024-06-10 ENCOUNTER — ANESTHESIA (OUTPATIENT)
Dept: ENDOSCOPY | Facility: HOSPITAL | Age: 44
End: 2024-06-10
Payer: COMMERCIAL

## 2024-06-10 ENCOUNTER — HOSPITAL ENCOUNTER (OUTPATIENT)
Facility: HOSPITAL | Age: 44
Discharge: HOME | End: 2024-06-10
Attending: INTERNAL MEDICINE | Admitting: INTERNAL MEDICINE
Payer: COMMERCIAL

## 2024-06-10 DIAGNOSIS — K64.8 INTERNAL HEMORRHOIDS: Primary | ICD-10-CM

## 2024-06-10 DIAGNOSIS — R19.8 PEPTIC ULCER SYMPTOMS: ICD-10-CM

## 2024-06-10 DIAGNOSIS — R19.4 CHANGE IN BOWEL HABITS: ICD-10-CM

## 2024-06-10 PROCEDURE — 25000003 PHARM REV CODE 250: Performed by: INTERNAL MEDICINE

## 2024-06-10 PROCEDURE — 37000009 HC ANESTHESIA EA ADD 15 MINS: Performed by: INTERNAL MEDICINE

## 2024-06-10 PROCEDURE — 27201012 HC FORCEPS, HOT/COLD, DISP: Performed by: INTERNAL MEDICINE

## 2024-06-10 PROCEDURE — 63600175 PHARM REV CODE 636 W HCPCS: Performed by: NURSE ANESTHETIST, CERTIFIED REGISTERED

## 2024-06-10 PROCEDURE — 25000003 PHARM REV CODE 250: Performed by: NURSE ANESTHETIST, CERTIFIED REGISTERED

## 2024-06-10 PROCEDURE — 45380 COLONOSCOPY AND BIOPSY: CPT | Performed by: INTERNAL MEDICINE

## 2024-06-10 PROCEDURE — 37000008 HC ANESTHESIA 1ST 15 MINUTES: Performed by: INTERNAL MEDICINE

## 2024-06-10 PROCEDURE — D9220A PRA ANESTHESIA: Mod: ANES,,, | Performed by: ANESTHESIOLOGY

## 2024-06-10 PROCEDURE — D9220A PRA ANESTHESIA: Mod: CRNA,,, | Performed by: NURSE ANESTHETIST, CERTIFIED REGISTERED

## 2024-06-10 PROCEDURE — 45380 COLONOSCOPY AND BIOPSY: CPT | Mod: ,,, | Performed by: INTERNAL MEDICINE

## 2024-06-10 RX ORDER — PANTOPRAZOLE SODIUM 40 MG/1
40 TABLET, DELAYED RELEASE ORAL DAILY
Qty: 90 TABLET | Refills: 3 | Status: SHIPPED | OUTPATIENT
Start: 2024-06-10 | End: 2025-06-10

## 2024-06-10 RX ORDER — PROPOFOL 10 MG/ML
VIAL (ML) INTRAVENOUS
Status: DISCONTINUED | OUTPATIENT
Start: 2024-06-10 | End: 2024-06-10

## 2024-06-10 RX ORDER — LIDOCAINE HYDROCHLORIDE 20 MG/ML
INJECTION INTRAVENOUS
Status: DISCONTINUED | OUTPATIENT
Start: 2024-06-10 | End: 2024-06-10

## 2024-06-10 RX ORDER — SODIUM CHLORIDE 9 MG/ML
INJECTION, SOLUTION INTRAVENOUS CONTINUOUS
Status: DISCONTINUED | OUTPATIENT
Start: 2024-06-10 | End: 2024-06-10 | Stop reason: HOSPADM

## 2024-06-10 RX ORDER — ONDANSETRON HYDROCHLORIDE 2 MG/ML
INJECTION, SOLUTION INTRAVENOUS
Status: DISCONTINUED | OUTPATIENT
Start: 2024-06-10 | End: 2024-06-10

## 2024-06-10 RX ADMIN — PROPOFOL 40 MG: 10 INJECTION, EMULSION INTRAVENOUS at 12:06

## 2024-06-10 RX ADMIN — PROPOFOL 120 MG: 10 INJECTION, EMULSION INTRAVENOUS at 12:06

## 2024-06-10 RX ADMIN — LIDOCAINE HYDROCHLORIDE 100 MG: 20 INJECTION, SOLUTION INTRAVENOUS at 12:06

## 2024-06-10 RX ADMIN — PROPOFOL 50 MG: 10 INJECTION, EMULSION INTRAVENOUS at 12:06

## 2024-06-10 RX ADMIN — ONDANSETRON 4 MG: 2 INJECTION INTRAMUSCULAR; INTRAVENOUS at 12:06

## 2024-06-10 RX ADMIN — SODIUM CHLORIDE: 9 INJECTION, SOLUTION INTRAVENOUS at 11:06

## 2024-06-10 NOTE — TRANSFER OF CARE
"Anesthesia Transfer of Care Note    Patient: Sandeep Hansen Jr.    Procedure(s) Performed: Procedure(s) (LRB):  COLONOSCOPY (N/A)    Patient location: PACU    Anesthesia Type: general    Transport from OR: Transported from OR on room air with adequate spontaneous ventilation    Post pain: adequate analgesia    Post assessment: no apparent anesthetic complications    Post vital signs: stable    Level of consciousness: awake    Nausea/Vomiting: no nausea/vomiting    Complications: none    Transfer of care protocol was followed      Last vitals: Visit Vitals  BP (!) 146/83 (BP Location: Left arm, Patient Position: Lying)   Pulse 76   Temp 36.9 °C (98.4 °F) (Skin)   Resp 16   Ht 5' 5" (1.651 m)   Wt 83.9 kg (185 lb)   SpO2 95%   BMI 30.79 kg/m²     "

## 2024-06-10 NOTE — ANESTHESIA PREPROCEDURE EVALUATION
06/10/2024  Sandeep Hansen Jr. is a 43 y.o., male.    Anesthesia Evaluation    I have reviewed the Patient Summary Reports.     I have reviewed the Nursing Notes. I have reviewed the NPO Status.   I have reviewed the Medications.     Review of Systems  Social:  Smoker, Alcohol Use       Hematology/Oncology:       -- Anemia:                                  Pulmonary:    Asthma       Asthma:   Chronic Obstructive Pulmonary Disease (COPD):                      Renal/:  Chronic Renal Disease        Kidney Function/Disease             Hepatic/GI:   PUD,      Gi / Hepatic Symptoms: Melena.               Physical Exam  General:  Well nourished       Airway/Jaw/Neck:  Airway Findings: Mouth Opening: Normal         General Airway Assessment: Adult      Mallampati: II  Improves to II with phonation.  TM Distance: Normal, at least 6 cm   Jaw/Neck Findings:     Neck ROM: Normal ROM          Dental:  Dental Findings: (Very poor repair, Carious to the gingival line) Periodontal disease, Severe   Multiple missing teeth. Bad dentition and loose teeth   Chest/Lungs:  Chest/Lungs Findings:  Expiratory Wheezes, Mod.       Heart/Vascular:  Heart Findings: Rate: Normal  Rhythm: Regular Rhythm                        Mental Status:  Mental Status Findings:  Cooperative, Alert and Oriented         Anesthesia Plan  Type of Anesthesia, risks & benefits discussed:  Anesthesia Type:  general    Patient's Preference:   Plan Factors:          Intra-op Monitoring Plan: standard ASA monitors  Intra-op Monitoring Plan Comments:   Post Op Pain Control Plan:   Post Op Pain Control Plan Comments:     Induction:   IV  Beta Blocker:  Patient is not currently on a Beta-Blocker (No further documentation required).       Informed Consent: Informed consent signed with the Patient and all parties understand the risks and agree with anesthesia  plan.  All questions answered.  Anesthesia consent signed with patient.  ASA Score: 3     Day of Surgery Review of History & Physical: I have interviewed and examined the patient. I have reviewed the patient's H&P dated:              Ready For Surgery From Anesthesia Perspective.             Physical Exam  General: Well nourished    Airway:  Mallampati: II / II  Mouth Opening: Normal  TM Distance: Normal, at least 6 cm  Neck ROM: Normal ROM    Dental:  Periodontal disease, Severe    Chest/Lungs:  Expiratory Wheezes, Mod.    Heart:  Rate: Normal  Rhythm: Regular Rhythm        Anesthesia Plan  Type of Anesthesia, risks & benefits discussed:    Anesthesia Type: general  Intra-op Monitoring Plan: standard ASA monitors  Induction:  IV  Informed Consent: Informed consent signed with the Patient and all parties understand the risks and agree with anesthesia plan.  All questions answered.   ASA Score: 3  Day of Surgery Review of History & Physical: I have interviewed and examined the patient. I have reviewed the patient's H&P dated:     Ready For Surgery From Anesthesia Perspective.     .   Epidermal Autograft Text: The defect edges were debeveled with a #15 scalpel blade.  Given the location of the defect, shape of the defect and the proximity to free margins an epidermal autograft was deemed most appropriate.  Using a sterile surgical marker, the primary defect shape was transferred to the donor site. The epidermal graft was then harvested.  The skin graft was then placed in the primary defect and oriented appropriately.

## 2024-06-10 NOTE — [employer] PATIENT INSTRUCTIONS
Discharge Summary/Instructions after an Endoscopic Procedure  Patient Name: Sandeep Hansen  Patient MRN: [id number]  Patient YOB: 1980  Monday, Bree 10, 2024  Tavo Kwon MD  Dear patient,  As a result of recent federal legislation (The Federal Cures Act), you may   receive lab or pathology results from your procedure in your [facility]   account before your physician is able to contact you. Your physician or   their representative will relay the results to you with their   recommendations at their soonest availability.  Thank you,  RESTRICTIONS:  During your procedure today, you received medications for sedation.  These   medications may affect your judgment, balance and coordination.  Therefore,   for 24 hours, you have the following restrictions:   - DO NOT drive a car, operate machinery, make legal/financial decisions,   sign important papers or drink alcohol.    ACTIVITY:  Today: no heavy lifting, straining or running due to procedural   sedation/anesthesia.  The following day: return to full activity including work.  DIET:  Eat and drink normally unless instructed otherwise.     TREATMENT FOR COMMON SIDE EFFECTS:  - Mild abdominal pain, nausea, belching, bloating or excessive gas:  rest,   eat lightly and use a heating pad.  - Sore Throat: treat with throat lozenges and/or gargle with warm salt   water.  - Because air was used during the procedure, expelling large amounts of air   from your rectum or belching is normal.  - If a bowel prep was taken, you may not have a bowel movement for 1-3 days.    This is normal.  SYMPTOMS TO WATCH FOR AND REPORT TO YOUR PHYSICIAN:  1. Abdominal pain or bloating, other than gas cramps.  2. Chest pain.  3. Back pain.  4. Signs of infection such as: chills or fever occurring within 24 hours   after the procedure.  5. Rectal bleeding, which would show as bright red, maroon, or black stools.   (A tablespoon of blood from the rectum is not serious, especially if    hemorrhoids are present.)  6. Vomiting.  7. Weakness or dizziness.  GO DIRECTLY TO THE NEAREST EMERGENCY ROOM IF YOU HAVE ANY OF THE FOLLOWING:      Difficulty breathing              Chills and/or fever over 101 F   Persistent vomiting and/or vomiting blood   Severe abdominal pain   Severe chest pain   Black, tarry stools   Bleeding- more than one tablespoon   Any other symptom or condition that you feel may need urgent attention  Your doctor recommends these additional instructions:  If any biopsies were taken, your doctors clinic will contact you in 1 to 2   weeks with any results.  - Patient has a contact number available for emergencies.  The signs and   symptoms of potential delayed complications were discussed with the   patient.  Return to normal activities tomorrow.  Written discharge   instructions were provided to the patient.   - High fiber diet.   - Continue present medications.   - Await pathology results.   - Repeat colonoscopy in 10 years for screening purposes.   - Discharge patient to home (ambulatory).   - Return to GI office after studies are complete.  For questions, problems or results please call your physician - Tavo Kwon MD at Work:  [phone].  [facility], EMERGENCY ROOM PHONE NUMBER: [phone]  IF A COMPLICATION OR EMERGENCY SITUATION ARISES AND YOU ARE UNABLE TO REACH   YOUR PHYSICIAN - GO DIRECTLY TO THE EMERGENCY ROOM.  Tavo Kwon MD  6/10/2024 12:53:12 PM  This report has been verified and signed electronically.  Dear patient,  As a result of recent federal legislation (The Federal Cures Act), you may   receive lab or pathology results from your procedure in your [facility]   account before your physician is able to contact you. Your physician or   their representative will relay the results to you with their   recommendations at their soonest availability.  Thank you,  [employer]

## 2024-06-10 NOTE — H&P
CC: Diarrhea    43 year old male with above. States that symptoms are stable, no alleviating/exacerbating factors. No family history of colorectal CA. No personal history of polyps. No bleeding or weight loss.     ROS:  No headache, no fever/chills, no chest pain/SOB, no nausea/vomiting/diarrhea/constipation/GI bleeding/abdominal pain, no dysuria/hematuria.    VSSAF   Exam:   Alert and oriented x 3; no apparent distress   PERRLA, sclera anicteric  CV: Regular rate/rhythm, normal PMI   Lungs: Clear bilaterally with no wheeze/rales   Abdomen: Soft, NT/ND, normal bowel sounds   Ext: No cyanosis, clubbing     Impression:   As above    Plan:   Proceed with endoscopy. Further recs to follow.

## 2024-06-11 VITALS
HEIGHT: 65 IN | SYSTOLIC BLOOD PRESSURE: 137 MMHG | HEART RATE: 64 BPM | DIASTOLIC BLOOD PRESSURE: 81 MMHG | TEMPERATURE: 98 F | BODY MASS INDEX: 30.82 KG/M2 | OXYGEN SATURATION: 97 % | RESPIRATION RATE: 19 BRPM | WEIGHT: 185 LBS

## 2024-06-11 NOTE — ANESTHESIA POSTPROCEDURE EVALUATION
Anesthesia Post Evaluation    Patient: Sandeep Hansen     Procedure(s) Performed: Procedure(s) (LRB):  COLONOSCOPY (N/A)    Final Anesthesia Type: general      Patient location during evaluation: PACU  Patient participation: Yes- Able to Participate  Level of consciousness: sedated and awake  Post-procedure vital signs: reviewed and stable  Pain management: adequate  Airway patency: patent    PONV status at discharge: No PONV  Anesthetic complications: no      Cardiovascular status: hypertensive, blood pressure returned to baseline and hemodynamically stable  Respiratory status: spontaneous ventilation  Hydration status: euvolemic  Follow-up not needed.              Vitals Value Taken Time   /78 06/10/24 1318   Temp  06/11/24 0856   Pulse 62 06/10/24 1322   Resp 19 06/10/24 1315   SpO2 96 % 06/10/24 1322   Vitals shown include unfiled device data.      Event Time   Out of Recovery 13:26:21         Pain/Jenna Score: Jenna Score: 10 (6/10/2024  1:15 PM)

## (undated) DEVICE — KIT ENDO CARRY ON PROCEDURE

## (undated) DEVICE — SOL IRRI STRL WATER 1000ML

## (undated) DEVICE — CANISTER SUCTION 3000CC